# Patient Record
Sex: FEMALE | Race: WHITE | Employment: FULL TIME | URBAN - METROPOLITAN AREA
[De-identification: names, ages, dates, MRNs, and addresses within clinical notes are randomized per-mention and may not be internally consistent; named-entity substitution may affect disease eponyms.]

---

## 2020-03-02 ENCOUNTER — OFFICE VISIT (OUTPATIENT)
Dept: FAMILY MEDICINE CLINIC | Age: 40
End: 2020-03-02

## 2020-03-02 VITALS
WEIGHT: 292.5 LBS | OXYGEN SATURATION: 97 % | DIASTOLIC BLOOD PRESSURE: 83 MMHG | SYSTOLIC BLOOD PRESSURE: 132 MMHG | TEMPERATURE: 98.2 F | HEIGHT: 67 IN | HEART RATE: 83 BPM | RESPIRATION RATE: 16 BRPM | BODY MASS INDEX: 45.91 KG/M2

## 2020-03-02 DIAGNOSIS — J30.89 NON-SEASONAL ALLERGIC RHINITIS, UNSPECIFIED TRIGGER: Primary | ICD-10-CM

## 2020-03-02 DIAGNOSIS — H69.83 DYSFUNCTION OF BOTH EUSTACHIAN TUBES: ICD-10-CM

## 2020-03-02 DIAGNOSIS — Z12.39 SCREENING FOR MALIGNANT NEOPLASM OF BREAST: ICD-10-CM

## 2020-03-02 DIAGNOSIS — G89.29 CHRONIC PAIN OF LEFT ANKLE: ICD-10-CM

## 2020-03-02 DIAGNOSIS — R42 VERTIGO: ICD-10-CM

## 2020-03-02 DIAGNOSIS — Z91.018 HISTORY OF FOOD ANAPHYLAXIS: ICD-10-CM

## 2020-03-02 DIAGNOSIS — M25.572 CHRONIC PAIN OF LEFT ANKLE: ICD-10-CM

## 2020-03-02 DIAGNOSIS — Z80.3 FAMILY HISTORY OF MALIGNANT NEOPLASM OF BREAST IN FIRST DEGREE RELATIVE DIAGNOSED WHEN YOUNGER THAN 50 YEARS OF AGE: ICD-10-CM

## 2020-03-02 RX ORDER — FLUTICASONE PROPIONATE 50 MCG
2 SPRAY, SUSPENSION (ML) NASAL DAILY
Qty: 3 BOTTLE | Refills: 1 | Status: SHIPPED | OUTPATIENT
Start: 2020-03-02 | End: 2020-09-17 | Stop reason: SDUPTHER

## 2020-03-02 RX ORDER — MONTELUKAST SODIUM 10 MG/1
10 TABLET ORAL DAILY
Qty: 90 TAB | Refills: 1 | Status: SHIPPED | OUTPATIENT
Start: 2020-03-02 | End: 2020-09-18

## 2020-03-02 RX ORDER — EPINEPHRINE 0.3 MG/.3ML
0.3 INJECTION SUBCUTANEOUS
Qty: 2 SYRINGE | Refills: 1 | Status: SHIPPED | OUTPATIENT
Start: 2020-03-02 | End: 2020-03-02

## 2020-03-02 RX ORDER — MECLIZINE HYDROCHLORIDE 25 MG/1
25 TABLET ORAL
Qty: 30 TAB | Refills: 1 | Status: SHIPPED | OUTPATIENT
Start: 2020-03-02 | End: 2020-03-12

## 2020-03-02 RX ORDER — CETIRIZINE HCL 10 MG
10 TABLET ORAL DAILY
Qty: 90 TAB | Refills: 1 | Status: SHIPPED | OUTPATIENT
Start: 2020-03-02 | End: 2022-01-28

## 2020-03-02 NOTE — PROGRESS NOTES
Leonela Andrade is a 42 y.o. female    Chief Complaint   Patient presents with    New Patient    Establish Care     No Specific conserns at this time. 1. Have you been to the ER, urgent care clinic since your last visit? Hospitalized since your last visit? No    2. Have you seen or consulted any other health care providers outside of the [address] [place] [address] since your last visit? Include any pap smears or colon screening.  No

## 2020-03-02 NOTE — PATIENT INSTRUCTIONS
Managing Your Allergies: Care Instructions Your Care Instructions Managing your allergies is an important part of staying healthy. Your doctor will help you find out what may be causing the allergies. Common causes of allergy symptoms are house dust and dust mites, animal dander, mold, and pollen. As soon as you know what triggers your symptoms, try to reduce your exposure to your triggers. This can help prevent allergy symptoms, asthma, and other health problems. Ask your doctor about allergy medicine or immunotherapy. These treatments may help reduce or prevent allergy symptoms. Follow-up care is a key part of your treatment and safety. Be sure to make and go to all appointments, and call your doctor if you are having problems. It's also a good idea to know your test results and keep a list of the medicines you take. How can you care for yourself at home? · If you think that dust or dust mites are causing your allergies: 
? Wash sheets, pillowcases, and other bedding every week in hot water. ? Use airtight, dust-proof covers for pillows, duvets, and mattresses. Avoid plastic covers, because they tend to tear quickly and do not \"breathe. \" Wash according to the instructions. ? Remove extra blankets and pillows that you don't need. ? Use blankets that are machine-washable. ? Don't use home humidifiers. They can help mites live longer. · Use air-conditioning. Change or clean all filters every month. Keep windows closed. Use high-efficiency air filters. Don't use window or attic fans, which draw dust into the air. · If you're allergic to pet dander, keep pets outside or, at the very least, out of your bedroom. Old carpet and cloth-covered furniture can hold a lot of animal dander. You may need to replace them. · Look for signs of cockroaches. Use cockroach baits to get rid of them. Then clean your home well.  
· If you're allergic to mold, don't keep indoor plants, because molds can grow in soil. Get rid of furniture, rugs, and drapes that smell musty. Check for mold in the bathroom. · If you're allergic to pollen, stay inside when pollen counts are high. · Don't smoke or let anyone else smoke in your house. Don't use fireplaces or wood-burning stoves. Avoid paint fumes, perfumes, and other strong odors. When should you call for help? Give an epinephrine shot if: 
  · You think you are having a severe allergic reaction.  
 After giving an epinephrine shot call 911, even if you feel better. 
 Call 911 if: 
  · You have symptoms of a severe allergic reaction. These may include: 
? Sudden raised, red areas (hives) all over your body. ? Swelling of the throat, mouth, lips, or tongue. ? Trouble breathing. ? Passing out (losing consciousness). Or you may feel very lightheaded or suddenly feel weak, confused, or restless.  
  · You have been given an epinephrine shot, even if you feel better.  
 Call your doctor now or seek immediate medical care if: 
  · You have symptoms of an allergic reaction, such as: ? A rash or hives (raised, red areas on the skin). ? Itching. ? Swelling. ? Belly pain, nausea, or vomiting.  
 Watch closely for changes in your health, and be sure to contact your doctor if: 
  · Your allergies get worse.  
  · You need help controlling your allergies.  
  · You have questions about allergy testing.  
  · You do not get better as expected. Where can you learn more? Go to http://luda-keon.info/. Enter L249 in the search box to learn more about \"Managing Your Allergies: Care Instructions. \" Current as of: April 7, 2019 Content Version: 12.2 © 5940-7290 [employer]. Care instructions adapted under license by [employer] (which disclaims liability or warranty for this information).  If you have questions about a medical condition or this instruction, always ask your healthcare professional. [facility] disclaims any warranty or liability for your use of this information. Dizziness: Care Instructions Your Care Instructions Dizziness is the feeling of unsteadiness or fuzziness in your head. It is different than having vertigo, which is a feeling that the room is spinning or that you are moving or falling. It is also different from lightheadedness, which is the feeling that you are about to faint. It can be hard to know what causes dizziness. Some people feel dizzy when they have migraine headaches. Sometimes bouts of flu can make you feel dizzy. Some medical conditions, such as heart problems or high blood pressure, can make you feel dizzy. Many medicines can cause dizziness, including medicines for high blood pressure, pain, or anxiety. If a medicine causes your symptoms, your doctor may recommend that you stop or change the medicine. If it is a problem with your heart, you may need medicine to help your heart work better. If there is no clear reason for your symptoms, your doctor may suggest watching and waiting for a while to see if the dizziness goes away on its own. Follow-up care is a key part of your treatment and safety. Be sure to make and go to all appointments, and call your doctor if you are having problems. It's also a good idea to know your test results and keep a list of the medicines you take. How can you care for yourself at home? · If your doctor recommends or prescribes medicine, take it exactly as directed. Call your doctor if you think you are having a problem with your medicine. · Do not drive while you feel dizzy. · Try to prevent falls. Steps you can take include: ? Using nonskid mats, adding grab bars near the tub, and using night-lights. ? Clearing your home so that walkways are free of anything you might trip on. 
? Letting family and friends know that you have been feeling dizzy. This will help them know how to help you. When should you call for help? Call 911 anytime you think you may need emergency care. For example, call if: 
  · You passed out (lost consciousness).  
  · You have dizziness along with symptoms of a heart attack. These may include: 
? Chest pain or pressure, or a strange feeling in the chest. 
? Sweating. ? Shortness of breath. ? Nausea or vomiting. ? Pain, pressure, or a strange feeling in the back, neck, jaw, or upper belly or in one or both shoulders or arms. ? Lightheadedness or sudden weakness. ? A fast or irregular heartbeat.  
  · You have symptoms of a stroke. These may include: 
? Sudden numbness, tingling, weakness, or loss of movement in your face, arm, or leg, especially on only one side of your body. ? Sudden vision changes. ? Sudden trouble speaking. ? Sudden confusion or trouble understanding simple statements. ? Sudden problems with walking or balance. ? A sudden, severe headache that is different from past headaches.  
 Call your doctor now or seek immediate medical care if: 
  · You feel dizzy and have a fever, headache, or ringing in your ears.  
  · You have new or increased nausea and vomiting.  
  · Your dizziness does not go away or comes back.  
 Watch closely for changes in your health, and be sure to contact your doctor if: 
  · You do not get better as expected. Where can you learn more? Go to http://luda-keon.info/. Enter Q439 in the search box to learn more about \"Dizziness: Care Instructions. \" Current as of: June 26, 2019 Content Version: 12.2 © 4649-6387 [employer]. Care instructions adapted under license by [employer] (which disclaims liability or warranty for this information). If you have questions about a medical condition or this instruction, always ask your healthcare professional. [address] any warranty or liability for your use of this information. Vertigo: Care Instructions Your Care Instructions Vertigo is the feeling that you or your surroundings are moving when there is no actual movement. It is often described as a feeling of spinning, whirling, falling, or tilting. Vertigo may make you vomit or feel nauseated. You may have trouble standing or walking and may lose your balance. Vertigo is often related to an inner ear problem, but it can have other more serious causes. If vertigo continues, you may need more tests to find its cause. Follow-up care is a key part of your treatment and safety. Be sure to make and go to all appointments, and call your doctor if you are having problems. It's also a good idea to know your test results and keep a list of the medicines you take. How can you care for yourself at home? · Do not lie flat on your back. Prop yourself up slightly. This may reduce the spinning feeling. Keep your eyes open. · Move slowly so that you do not fall. · If your doctor recommends medicine, take it exactly as directed. · Do not drive while you are having vertigo. Certain exercises, called Galarza-Daroff exercises, can help decrease vertigo. To do Galarza-Daroff exercises: · Sit on the edge of a bed or sofa and quickly lie down on the side that causes the worst vertigo. Lie on your side with your ear down. · Stay in this position for at least 30 seconds or until the vertigo goes away. · Sit up. If this causes vertigo, wait for it to stop. · Repeat the procedure on the other side. · Repeat this 10 times. Do these exercises 2 times a day until the vertigo is gone. When should you call for help? Call 911 anytime you think you may need emergency care. For example, call if: 
  · You passed out (lost consciousness).  
  · You have symptoms of a stroke. These may include: 
? Sudden numbness, tingling, weakness, or loss of movement in your face, arm, or leg, especially on only one side of your body. ? Sudden vision changes. ? Sudden trouble speaking. ? Sudden confusion or trouble understanding simple statements. ? Sudden problems with walking or balance. ? A sudden, severe headache that is different from past headaches.  
 Call your doctor now or seek immediate medical care if: 
  · Vertigo occurs with a fever, a headache, or ringing in your ears.  
  · You have new or increased nausea and vomiting.  
 Watch closely for changes in your health, and be sure to contact your doctor if: 
  · Vertigo gets worse or happens more often.  
  · Vertigo has not gotten better after 2 weeks. Where can you learn more? Go to http://luda-keon.info/. Enter T202 in the search box to learn more about \"Vertigo: Care Instructions. \" Current as of: October 21, 2018 Content Version: 12.2 © 0753-6806 [employer]. Care instructions adapted under license by [employer] (which disclaims liability or warranty for this information). If you have questions about a medical condition or this instruction, always ask your healthcare professional. [place] [address] any warranty or liability for your use of this information.

## 2020-03-03 NOTE — PROGRESS NOTES
Elin Madrid is a 42 y.o. female who presents with the following complaints:  Chief Complaint   Patient presents with    New Patient    Establish Care     No Specific conserns at this time. Subjective:    HPI:   Patient recently moved to the area. She is an  for [employer]. Most recently posted in [address] [place] [address] (briefly), prior to this was stationed in [facility]. Presents to establish care and for evaluation of allergic rhinitis/sinus congestion, ear pain/pressure, vertigo, left ankle pain/swelling, food allergy/anaphylaxis, and history of breast cancer. Reports long hx of environmental allergies with sinus surgery in the past. Current treatment plan includes flonase nasal spray and allegra. Reports good compliance with antihistamine; compliance not as good for nasal steroid spray. Has used sinus rinses in the past but has not gotten back into the habit since her move. She is not interested in immunotherapy/allergy shots. Has had 2 brief episodes of vertigo over the past 2 months; describes as abrupt onset of sensation that room is spinning. First episode seemed to be triggered by rolling over in bed. Used over the counter luisa product with minimal relief. Symptoms resolved spontaneously. 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 Needs refill of epipen. Also notes allergy to corn, much less severe. C/o chronic left ankle swelling, stiffness, pain following multiple sprains over the course of several years. Reports she was receiving laser treatments for her symptoms prior to her move, would like to pursue further treatment here. Reports mother developed breast cancer at age 45; patient has been having yearly screening mammograms since age 31, now due for her annual screening. Pertinent PMH/FH/SH:  History reviewed. No pertinent past medical history.   Past Surgical History:   Procedure Laterality Date    HX REFRACTIVE SURGERY Bilateral     HX TONSILLECTOMY  2014     Family History   Problem Relation Age of Onset    Cancer Mother         Breast Cancer     Social History     Socioeconomic History    Marital status:      Spouse name: Not on file    Number of children: Not on file    Years of education: Not on file    Highest education level: Not on file   Tobacco Use    Smoking status: Never Smoker    Smokeless tobacco: Never Used   Substance and Sexual Activity    Alcohol use: Not Currently    Drug use: Never     Advanced Directives: N      There are no active problems to display for this patient.       Nurse notes were reviewed and are correct  Review of Systems - negative except as listed above in the HPI    Objective:     Vitals:    03/02/20 1016   BP: 132/83   Pulse: 83   Resp: 16   Temp: 98.2 °F (36.8 °C)   TempSrc: Oral   SpO2: 97%   Weight: 292 lb 8 oz (132.7 kg)   Height: 5' 7\" (1.702 m)     Physical Examination: General appearance - alert, well appearing, and in no distress, oriented to person, place, and time, overweight and well hydrated  Mental status - normal mood, behavior, speech, dress, motor activity, and thought processes  Eyes - sclera anicteric  Ears - bilateral TM's and external ear canals normal  Nose - normal and patent, no erythema, discharge or polyps, normal nontender sinuses and clear rhinorrhea  Mouth - mucous membranes moist, pharynx normal without lesions  Neck - supple, no significant adenopathy, thyroid exam: thyroid is normal in size without nodules or tenderness  Chest - clear to auscultation, no wheezes, rales or rhonchi, symmetric air entry  Heart - normal rate, regular rhythm, normal S1, S2, no murmurs, rubs, clicks or gallops  Abdomen - soft, nontender, nondistended, no masses or organomegaly  bowel sounds normal  Neurological - alert, oriented, normal speech, no focal findings or movement disorder noted  Musculoskeletal -no muscular tenderness noted; left ankle soft tissue edema, reduced ROM, mild diffuse tenderness  Extremities - no pedal edema noted, intact peripheral pulses  Skin - normal coloration and turgor, no rashes, no suspicious skin lesions noted    Assessment/ Plan:   Diagnoses and all orders for this visit:    1. Non-seasonal allergic rhinitis, unspecified trigger  2. Dysfunction of both eustachian tubes  Continue antihistamine  Improve compliance with nasal steroid  Add montelukast  Resume daily sinus rinse with neti pot  -     montelukast (SINGULAIR) 10 mg tablet; Take 1 Tab by mouth daily. -     cetirizine (ZYRTEC) 10 mg tablet; Take 1 Tab by mouth daily. -     fluticasone propionate (FLONASE) 50 mcg/actuation nasal spray; 2 Sprays by Both Nostrils route daily. 3. Vertigo  Add rx  -     meclizine (ANTIVERT) 25 mg tablet; Take 1 Tab by mouth three (3) times daily as needed for Dizziness for up to 10 days. 4. Chronic pain of left ankle  Refer for further eval and management  -     REFERRAL TO ORTHOPEDICS    5. History of food anaphylaxis  -     EPINEPHrine (EPIPEN 2-DAYAN) 0.3 mg/0.3 mL injection; 0.3 mL by IntraMUSCular route once as needed for Allergic Response for up to 1 dose. 6. Screening for malignant neoplasm of breast  7. Family history of malignant neoplasm of breast in first degree relative diagnosed when younger than 48years of age  -     [facility] [de-identified] BI SCREENING INCL CAD; Future    8. BMI 45.0-49.9, adult ([facility] [address]  I have reviewed/discussed the above normal BMI with the patient. I have recommended the following interventions: dietary management education, guidance, and counseling, encourage exercise and monitor weight Gaby Perez Follow-up and Dispositions    · Return in about 10 weeks (around 5/11/2020), or if symptoms worsen or fail to improve, for annual physical.         I have discussed the diagnosis with the patient and the intended plan as seen in the above orders.   The patient has received an after-visit summary and questions were answered concerning future plans. The patient verbalizes understanding. Medication Side Effects and Warnings were discussed with patient: yes  Patient Labs were reviewed and or requested: yes  Patient Past Records were reviewed and or requested: yes    Patient Instructions          Managing Your Allergies: Care Instructions  Your Care Instructions    Managing your allergies is an important part of staying healthy. Your doctor will help you find out what may be causing the allergies. Common causes of allergy symptoms are house dust and dust mites, animal dander, mold, and pollen. As soon as you know what triggers your symptoms, try to reduce your exposure to your triggers. This can help prevent allergy symptoms, asthma, and other health problems. Ask your doctor about allergy medicine or immunotherapy. These treatments may help reduce or prevent allergy symptoms. Follow-up care is a key part of your treatment and safety. Be sure to make and go to all appointments, and call your doctor if you are having problems. It's also a good idea to know your test results and keep a list of the medicines you take. How can you care for yourself at home? · If you think that dust or dust mites are causing your allergies:  ? Wash sheets, pillowcases, and other bedding every week in hot water. ? Use airtight, dust-proof covers for pillows, duvets, and mattresses. Avoid plastic covers, because they tend to tear quickly and do not \"breathe. \" Wash according to the instructions. ? Remove extra blankets and pillows that you don't need. ? Use blankets that are machine-washable. ? Don't use home humidifiers. They can help mites live longer. · Use air-conditioning. Change or clean all filters every month. Keep windows closed. Use high-efficiency air filters. Don't use window or attic fans, which draw dust into the air.   · If you're allergic to pet dander, keep pets outside or, at the very least, out of your bedroom. Old carpet and cloth-covered furniture can hold a lot of animal dander. You may need to replace them. · Look for signs of cockroaches. Use cockroach baits to get rid of them. Then clean your home well. · If you're allergic to mold, don't keep indoor plants, because molds can grow in soil. Get rid of furniture, rugs, and drapes that smell musty. Check for mold in the bathroom. · If you're allergic to pollen, stay inside when pollen counts are high. · Don't smoke or let anyone else smoke in your house. Don't use fireplaces or wood-burning stoves. Avoid paint fumes, perfumes, and other strong odors. When should you call for help? Give an epinephrine shot if:    · You think you are having a severe allergic reaction.    After giving an epinephrine shot call 911, even if you feel better.   Call 911 if:    · You have symptoms of a severe allergic reaction. These may include:  ? Sudden raised, red areas (hives) all over your body. ? Swelling of the throat, mouth, lips, or tongue. ? Trouble breathing. ? Passing out (losing consciousness). Or you may feel very lightheaded or suddenly feel weak, confused, or restless.     · You have been given an epinephrine shot, even if you feel better.    Call your doctor now or seek immediate medical care if:    · You have symptoms of an allergic reaction, such as:  ? A rash or hives (raised, red areas on the skin). ? Itching. ? Swelling. ? Belly pain, nausea, or vomiting.    Watch closely for changes in your health, and be sure to contact your doctor if:    · Your allergies get worse.     · You need help controlling your allergies.     · You have questions about allergy testing.     · You do not get better as expected. Where can you learn more? Go to http://luda-keon.info/. Enter L249 in the search box to learn more about \"Managing Your Allergies: Care Instructions. \"  Current as of: April 7, 2019  Content Version: 12.2  © 7859-4386 Healthwise, Incorporated. Care instructions adapted under license by [employer] (which disclaims liability or warranty for this information). If you have questions about a medical condition or this instruction, always ask your healthcare professional. [address] any warranty or liability for your use of this information. Dizziness: Care Instructions  Your Care Instructions  Dizziness is the feeling of unsteadiness or fuzziness in your head. It is different than having vertigo, which is a feeling that the room is spinning or that you are moving or falling. It is also different from lightheadedness, which is the feeling that you are about to faint. It can be hard to know what causes dizziness. Some people feel dizzy when they have migraine headaches. Sometimes bouts of flu can make you feel dizzy. Some medical conditions, such as heart problems or high blood pressure, can make you feel dizzy. Many medicines can cause dizziness, including medicines for high blood pressure, pain, or anxiety. If a medicine causes your symptoms, your doctor may recommend that you stop or change the medicine. If it is a problem with your heart, you may need medicine to help your heart work better. If there is no clear reason for your symptoms, your doctor may suggest watching and waiting for a while to see if the dizziness goes away on its own. Follow-up care is a key part of your treatment and safety. Be sure to make and go to all appointments, and call your doctor if you are having problems. It's also a good idea to know your test results and keep a list of the medicines you take. How can you care for yourself at home? · If your doctor recommends or prescribes medicine, take it exactly as directed. Call your doctor if you think you are having a problem with your medicine. · Do not drive while you feel dizzy. · Try to prevent falls. Steps you can take include:  ?  Using nonskid mats, adding grab bars near the tub, and using night-lights. ? Clearing your home so that walkways are free of anything you might trip on.  ? Letting family and friends know that you have been feeling dizzy. This will help them know how to help you. When should you call for help? Call 911 anytime you think you may need emergency care. For example, call if:    · You passed out (lost consciousness).     · You have dizziness along with symptoms of a heart attack. These may include:  ? Chest pain or pressure, or a strange feeling in the chest.  ? Sweating. ? Shortness of breath. ? Nausea or vomiting. ? Pain, pressure, or a strange feeling in the back, neck, jaw, or upper belly or in one or both shoulders or arms. ? Lightheadedness or sudden weakness. ? A fast or irregular heartbeat.     · You have symptoms of a stroke. These may include:  ? Sudden numbness, tingling, weakness, or loss of movement in your face, arm, or leg, especially on only one side of your body. ? Sudden vision changes. ? Sudden trouble speaking. ? Sudden confusion or trouble understanding simple statements. ? Sudden problems with walking or balance. ? A sudden, severe headache that is different from past headaches.    Call your doctor now or seek immediate medical care if:    · You feel dizzy and have a fever, headache, or ringing in your ears.     · You have new or increased nausea and vomiting.     · Your dizziness does not go away or comes back.    Watch closely for changes in your health, and be sure to contact your doctor if:    · You do not get better as expected. Where can you learn more? Go to http://luda-keon.info/. Enter B848 in the search box to learn more about \"Dizziness: Care Instructions. \"  Current as of: June 26, 2019  Content Version: 12.2  © 0966-5065 [employer]. Care instructions adapted under license by [employer] (which disclaims liability or warranty for this information).  If you have questions about a medical condition or this instruction, always ask your healthcare professional. [address] any warranty or liability for your use of this information. Vertigo: Care Instructions  Your Care Instructions    Vertigo is the feeling that you or your surroundings are moving when there is no actual movement. It is often described as a feeling of spinning, whirling, falling, or tilting. Vertigo may make you vomit or feel nauseated. You may have trouble standing or walking and may lose your balance. Vertigo is often related to an inner ear problem, but it can have other more serious causes. If vertigo continues, you may need more tests to find its cause. Follow-up care is a key part of your treatment and safety. Be sure to make and go to all appointments, and call your doctor if you are having problems. It's also a good idea to know your test results and keep a list of the medicines you take. How can you care for yourself at home? · Do not lie flat on your back. Prop yourself up slightly. This may reduce the spinning feeling. Keep your eyes open. · Move slowly so that you do not fall. · If your doctor recommends medicine, take it exactly as directed. · Do not drive while you are having vertigo. Certain exercises, called Galarza-Daroff exercises, can help decrease vertigo. To do Galarza-Daroff exercises:  · Sit on the edge of a bed or sofa and quickly lie down on the side that causes the worst vertigo. Lie on your side with your ear down. · Stay in this position for at least 30 seconds or until the vertigo goes away. · Sit up. If this causes vertigo, wait for it to stop. · Repeat the procedure on the other side. · Repeat this 10 times. Do these exercises 2 times a day until the vertigo is gone. When should you call for help? Call 911 anytime you think you may need emergency care.  For example, call if:    · You passed out (lost consciousness).     · You have symptoms of a stroke. These may include:  ? Sudden numbness, tingling, weakness, or loss of movement in your face, arm, or leg, especially on only one side of your body. ? Sudden vision changes. ? Sudden trouble speaking. ? Sudden confusion or trouble understanding simple statements. ? Sudden problems with walking or balance. ? A sudden, severe headache that is different from past headaches.    Call your doctor now or seek immediate medical care if:    · Vertigo occurs with a fever, a headache, or ringing in your ears.     · You have new or increased nausea and vomiting.    Watch closely for changes in your health, and be sure to contact your doctor if:    · Vertigo gets worse or happens more often.     · Vertigo has not gotten better after 2 weeks. Where can you learn more? Go to http://luda-keon.info/. Enter D449 in the search box to learn more about \"Vertigo: Care Instructions. \"  Current as of: October 21, 2018  Content Version: 12.2  © 2127-0343 "[employer] Incorporated. Care instructions adapted under license by [facility] (which disclaims liability or warranty for this information). If you have questions about a medical condition or this instruction, always ask your healthcare professional. [place] [address] any warranty or liability for your use of this information.             Romel ELLIOTT

## 2020-03-13 ENCOUNTER — HOSPITAL ENCOUNTER (OUTPATIENT)
Dept: MAMMOGRAPHY | Age: 40
Discharge: HOME | End: 2020-03-13
Attending: NURSE PRACTITIONER
Payer: COMMERCIAL

## 2020-03-13 DIAGNOSIS — Z80.3 FAMILY HISTORY OF MALIGNANT NEOPLASM OF BREAST IN FIRST DEGREE RELATIVE DIAGNOSED WHEN YOUNGER THAN 50 YEARS OF AGE: ICD-10-CM

## 2020-03-13 DIAGNOSIS — Z12.39 SCREENING FOR MALIGNANT NEOPLASM OF BREAST: ICD-10-CM

## 2020-03-13 PROCEDURE — 77063 BREAST TOMOSYNTHESIS BI: CPT

## 2020-09-16 DIAGNOSIS — J30.89 NON-SEASONAL ALLERGIC RHINITIS, UNSPECIFIED TRIGGER: ICD-10-CM

## 2020-09-16 DIAGNOSIS — H69.83 DYSFUNCTION OF BOTH EUSTACHIAN TUBES: ICD-10-CM

## 2020-09-18 RX ORDER — FLUTICASONE PROPIONATE 50 MCG
2 SPRAY, SUSPENSION (ML) NASAL DAILY
Qty: 3 BOTTLE | Refills: 1 | Status: SHIPPED | OUTPATIENT
Start: 2020-09-18 | End: 2022-02-22 | Stop reason: SDUPTHER

## 2020-09-18 RX ORDER — MONTELUKAST SODIUM 10 MG/1
TABLET ORAL
Qty: 90 TAB | Refills: 0 | Status: SHIPPED | OUTPATIENT
Start: 2020-09-18 | End: 2020-12-11 | Stop reason: SDUPTHER

## 2020-12-11 DIAGNOSIS — H69.83 DYSFUNCTION OF BOTH EUSTACHIAN TUBES: ICD-10-CM

## 2020-12-11 DIAGNOSIS — J30.89 NON-SEASONAL ALLERGIC RHINITIS, UNSPECIFIED TRIGGER: ICD-10-CM

## 2020-12-12 RX ORDER — MONTELUKAST SODIUM 10 MG/1
10 TABLET ORAL DAILY
Qty: 90 TAB | Refills: 0 | Status: SHIPPED | OUTPATIENT
Start: 2020-12-12 | End: 2021-03-07

## 2021-01-19 ENCOUNTER — VIRTUAL VISIT (OUTPATIENT)
Dept: FAMILY MEDICINE CLINIC | Age: 41
End: 2021-01-19
Payer: COMMERCIAL

## 2021-01-19 DIAGNOSIS — M25.572 CHRONIC PAIN OF LEFT ANKLE: Primary | ICD-10-CM

## 2021-01-19 DIAGNOSIS — Z91.018 HISTORY OF FOOD ANAPHYLAXIS: ICD-10-CM

## 2021-01-19 DIAGNOSIS — Z12.31 ENCOUNTER FOR SCREENING MAMMOGRAM FOR MALIGNANT NEOPLASM OF BREAST: ICD-10-CM

## 2021-01-19 DIAGNOSIS — G89.29 CHRONIC PAIN OF LEFT ANKLE: Primary | ICD-10-CM

## 2021-01-19 DIAGNOSIS — M79.2 NEUROPATHIC PAIN: ICD-10-CM

## 2021-01-19 DIAGNOSIS — J30.89 NON-SEASONAL ALLERGIC RHINITIS, UNSPECIFIED TRIGGER: ICD-10-CM

## 2021-01-19 DIAGNOSIS — Z80.3 FAMILY HISTORY OF MALIGNANT NEOPLASM OF BREAST IN FIRST DEGREE RELATIVE DIAGNOSED WHEN YOUNGER THAN 50 YEARS OF AGE: ICD-10-CM

## 2021-01-19 PROCEDURE — 99214 OFFICE O/P EST MOD 30 MIN: CPT | Performed by: NURSE PRACTITIONER

## 2021-01-19 RX ORDER — PREGABALIN 75 MG/1
75 CAPSULE ORAL 2 TIMES DAILY
Qty: 180 CAP | Refills: 0 | Status: SHIPPED
Start: 2021-01-19 | End: 2021-06-27

## 2021-01-19 RX ORDER — PREGABALIN 75 MG/1
75 CAPSULE ORAL 2 TIMES DAILY
COMMUNITY
Start: 2020-12-15 | End: 2021-01-19 | Stop reason: SDUPTHER

## 2021-01-19 RX ORDER — EPINEPHRINE 0.3 MG/.3ML
0.3 INJECTION SUBCUTANEOUS
Qty: 0.6 ML | Refills: 1 | Status: SHIPPED | OUTPATIENT
Start: 2021-01-19 | End: 2021-01-19

## 2021-01-19 NOTE — PROGRESS NOTES
Joana Thurman is a 47 y.o. female who was seen by synchronous (real-time) audio-video technology on 1/19/2021 for Medication Refill, Other (Covid Vaccine ), and Weight Management        Assessment & Plan:   Diagnoses and all orders for this visit:    1. Chronic pain of left ankle  2. Neuropathic pain  Improving  Continue PT  Continue pregabalin- we discussed   -     pregabalin (LYRICA) 75 mg capsule; Take 1 Cap by mouth two (2) times a day. Max Daily Amount: 150 mg.    3. Non-seasonal allergic rhinitis, unspecified trigger  controlled  Continue rx    4. BMI 45.0-49.9, adult ([address]  I have reviewed/discussed the above normal BMI with the patient. I have recommended the following interventions: dietary management education, guidance, and counseling, encourage exercise and monitor weight . Discussed diet plans for weight loss, such as high protein diet, Mediterranean, DASH diet, paleo, and commercial weight loss programs such as weight watchers  Discussed possible addition of medications such as phentermine and topiramate to help with appetite suppression  She plans to consider options while she completes post-surgical PT and will let us know how she plans to proceed when she is ready. 5. Family history of malignant neoplasm of breast in first degree relative diagnosed when younger than 48years of age  10. Encounter for screening mammogram for malignant neoplasm of breast  -     [facility] BI SCREENING INCL CAD; Future    7. History of food anaphylaxis  Refill rx  Take epipen to vaccination appt, inform administering staff of her hx of anaphylaxis/food allergy. Remain at site for period of observation of at least 20 minutes post vaccination  -     EPINEPHrine (EPIPEN) 0.3 mg/0.3 mL injection; 0.3 mL by IntraMUSCular route once as needed for Allergic Response for up to 1 dose. Follow-up and Dispositions    · Return in about 3 months (around 4/19/2021) for ankle pain, weight loss.        I have discussed the diagnosis with the patient and the intended plan as seen in the above orders, and questions were answered concerning future plans. Patient conveyed understanding of the plan at the time of the visit. 712  Subjective:     HPI:    Presents for follow up of chronic ankle pain, obesity, allergic rhinitis, and food allergy. Underwent left ankle surgery in November, doing well, making good progress with physical therapy. Currently using ankle brace and 1 crutch. Her progress in her recovery has motivated her to pursue weight loss once she is cleared to exercise. She is interested in learning more about dietary recommendations for weight loss and medications that may assist with weight loss. Reports continued painful \"zinging\" pain in the ankle post op, was present before surgery but now seems to have been aggravated by time in the cast. Symptoms make it difficult to fall asleep most nights. Her orthopedist started pregabalin, which is giving some relief, but has advised that she may need to be on this for a longer period of time and recommended she discuss rx with PCP. Requesting refill of epipen for food allergies. Wants to have on hand when she receives covid vaccine at work later this week. Prior to Admission medications    Medication Sig Start Date End Date Taking? Authorizing Provider   pregabalin (LYRICA) 75 mg capsule Take 1 Cap by mouth two (2) times a day. Max Daily Amount: 150 mg. 1/19/21  Yes Mariano Thomas, NP   EPINEPHrine (EPIPEN) 0.3 mg/0.3 mL injection 0.3 mL by IntraMUSCular route once as needed for Allergic Response for up to 1 dose. 1/19/21 1/19/21 Yes Mariano Thomas, NP   montelukast (SINGULAIR) 10 mg tablet Take 1 Tab by mouth daily. 12/12/20  Yes Mariano Thomas, NP   fluticasone propionate (FLONASE) 50 mcg/actuation nasal spray 2 Sprays by Both Nostrils route daily. 9/18/20  Yes Mariano Thomas, NP   cetirizine (ZYRTEC) 10 mg tablet Take 1 Tab by mouth daily.  3/2/20 Yes Mariano Thomas Q, NP   pregabalin (LYRICA) 75 mg capsule Take 75 mg by mouth two (2) times a day. 12/15/20 1/19/21  Provider, Historical     There is no problem list on file for this patient. Allergies   Allergen Reactions    Corn Hives and Itching    Fish Containing Products Shortness of Breath    Grass Pollen Hives    Progesterone Hives     History reviewed. No pertinent past medical history. Past Surgical History:   Procedure Laterality Date    HX REFRACTIVE SURGERY Bilateral     HX TONSILLECTOMY  2014     Family History   Problem Relation Age of Onset    Cancer Mother         Breast Cancer    Breast Cancer Mother 45        alive     Social History     Tobacco Use    Smoking status: Never Smoker    Smokeless tobacco: Never Used   Substance Use Topics    Alcohol use: Not Currently       Review of Systems   Constitutional: Negative for chills, fever, malaise/fatigue and weight loss. HENT: Negative. Eyes: Negative. Respiratory: Negative. Cardiovascular: Negative. Gastrointestinal: Negative. Genitourinary: Negative. Musculoskeletal: Positive for joint pain. Skin: Negative. Neurological: Negative. Endo/Heme/Allergies: Negative. Psychiatric/Behavioral: Negative. Objective:   No flowsheet data found. General: alert, cooperative, no distress   Mental  status: normal mood, behavior, speech, dress, motor activity, and thought processes, able to follow commands   HENT: NCAT   Neck: no visualized mass   Resp: no respiratory distress   Neuro: no gross deficits   Skin: no discoloration or lesions of concern on visible areas   Psychiatric: normal affect, consistent with stated mood, no evidence of hallucinations     Additional exam findings:   none    We discussed the expected course, resolution and complications of the diagnosis(es) in detail. Medication risks, benefits, costs, interactions, and alternatives were discussed as indicated.   I advised her to contact the office if her condition worsens, changes or fails to improve as anticipated. She expressed understanding with the diagnosis(es) and plan. Aman Thomas, who was evaluated through a patient-initiated, synchronous (real-time) audio-video encounter, and/or her healthcare decision maker, is aware that it is a billable service, with coverage as determined by her insurance carrier. She provided verbal consent to proceed: Yes, and patient identification was verified. It was conducted pursuant to the emergency declaration under the [address] [place] [address], [address] [place] [address] authority and the [employer] and [employer] Act. A caregiver was present when appropriate. Ability to conduct physical exam was limited. I was at home. The patient was at home.       Jana Rodriguez NP  01/19/21

## 2021-01-19 NOTE — PATIENT INSTRUCTIONS
Neuropathic Pain: Care Instructions Your Care Instructions Neuropathic pain is caused by pressure on or damage to your nerves. It's often simply called nerve pain. Some people feel this type of pain all the time. For others, it comes and goes. Diabetes, shingles, or an injury can cause nerve pain. Many people say the pain feels sharp, burning, or stabbing. But some people feel it as a dull ache. In some cases, it makes your skin very sensitive. So touch, pressure, and other sensations that did not hurt before may now cause pain. It's important to know that this kind of pain is real and can affect your quality of life. It's also important to know that treatment can help. Treatment includes pain medicines, exercise, and physical therapy. Medicines can help reduce the number of pain signals that travel over the nerves. This can make the painful areas less sensitive. It can also help you sleep better and improve your mood. But medicines are only one part of successful treatment. Most people do best with more than one kind of treatment. Your doctor may recommend that you try cognitive-behavioral therapy and stress management. Or, if needed, you may decide to try to quit smoking, lower your blood pressure, or better control blood sugar. These kinds of healthy changes can also make a difference. If you feel that your treatment is not working, talk to your doctor. And be sure to tell your doctor if you think you might be depressed or anxious. These are common problems that can also be treated. Follow-up care is a key part of your treatment and safety. Be sure to make and go to all appointments, and call your doctor if you are having problems. It's also a good idea to know your test results and keep a list of the medicines you take. How can you care for yourself at home? · Be safe with medicines. Read and follow all instructions on the label. ? If the doctor gave you a prescription medicine for pain, take it as prescribed. ? If you are not taking a prescription pain medicine, ask your doctor if you can take an over-the-counter medicine. · Save hard tasks for days when you have less pain. Follow a hard task with an easy task. And remember to take breaks. · Relax, and reduce stress. You may want to try deep breathing or meditation. These can help. · Keep moving. Gentle, daily exercise can help reduce pain. Your doctor or physical therapist can tell you what type of exercise is best for you. This may include walking, swimming, and stationary biking. It may also include stretches and range-of-motion exercises. · Try heat, cold packs, and massage. · Get enough sleep. Constant pain can make you more tired. If the pain makes it hard to sleep, talk with your doctor. · Think positively. Your thoughts can affect your pain. Do fun things to distract yourself from the pain. See a movie, read a book, listen to music, or spend time with a friend. · Keep a pain diary. Try to write down how strong your pain is and what it feels like. Also try to notice and write down how your moods, thoughts, sleep, activities, and medicine affect your pain. These notes can help you and your doctor find the best ways to treat your pain. Reducing constipation caused by pain medicine Pain medicines often cause constipation. To reduce constipation: 
· Include fruits, vegetables, beans, and whole grains in your diet each day. These foods are high in fiber. · Drink plenty of fluids, enough so that your urine is light yellow or clear like water. If you have kidney, heart, or liver disease and have to limit fluids, talk with your doctor before you increase the amount of fluids you drink. · Get some exercise every day. Build up slowly to 30 to 60 minutes a day on 5 or more days of the week. · Take a fiber supplement, such as Citrucel or Metamucil, every day if needed. Read and follow all instructions on the label. · Schedule time each day for a bowel movement. Having a daily routine may help. Take your time and do not strain when having a bowel movement. · Ask your doctor about a laxative. The goal is to have one easy bowel movement every 1 to 2 days. Do not let constipation go untreated for more than 3 days. When should you call for help? Call your doctor now or seek immediate medical care if: 
  · You feel sad, anxious, or hopeless for more than a few days. This could mean you are depressed. Depression is common in people who have a lot of pain. But it can be treated.  
  · You have trouble with bowel movements, such as: 
? No bowel movement in 3 days. ? Blood in the anal area, in your stool, or on the toilet paper. ? Diarrhea for more than 24 hours. Watch closely for changes in your health, and be sure to contact your doctor if: 
  · Your pain is getting worse.  
  · You can't sleep because of pain.  
  · You are very worried or anxious about your pain.  
  · You have trouble taking your pain medicine.  
  · You have any concerns about your pain medicine or its side effects.  
  · You have vomiting or cramps for more than 2 hours. Where can you learn more? Go to http://www.gray.com/ Enter N419 in the search box to learn more about \"Neuropathic Pain: Care Instructions. \" Current as of: November 20, 2019               Content Version: 12.6 © 1737-4273 Healthwise, Incorporated. Care instructions adapted under license by [employer] (which disclaims liability or warranty for this information). If you have questions about a medical condition or this instruction, always ask your healthcare professional. [address] any warranty or liability for your use of this information.

## 2021-04-12 ENCOUNTER — HOSPITAL ENCOUNTER (OUTPATIENT)
Dept: MAMMOGRAPHY | Age: 41
Discharge: HOME | End: 2021-04-12
Attending: NURSE PRACTITIONER
Payer: COMMERCIAL

## 2021-04-12 PROCEDURE — 77067 SCR MAMMO BI INCL CAD: CPT

## 2021-06-22 ENCOUNTER — OFFICE VISIT (OUTPATIENT)
Dept: FAMILY MEDICINE CLINIC | Age: 41
End: 2021-06-22
Payer: COMMERCIAL

## 2021-06-22 VITALS
HEART RATE: 77 BPM | TEMPERATURE: 98.2 F | DIASTOLIC BLOOD PRESSURE: 83 MMHG | BODY MASS INDEX: 43.07 KG/M2 | SYSTOLIC BLOOD PRESSURE: 133 MMHG | HEIGHT: 67 IN | WEIGHT: 274.4 LBS | RESPIRATION RATE: 14 BRPM | OXYGEN SATURATION: 99 %

## 2021-06-22 DIAGNOSIS — J30.89 NON-SEASONAL ALLERGIC RHINITIS, UNSPECIFIED TRIGGER: ICD-10-CM

## 2021-06-22 DIAGNOSIS — R73.03 PREDIABETES: ICD-10-CM

## 2021-06-22 DIAGNOSIS — R71.8 MICROCYTOSIS: ICD-10-CM

## 2021-06-22 DIAGNOSIS — R42 VERTIGO: ICD-10-CM

## 2021-06-22 DIAGNOSIS — E78.5 DYSLIPIDEMIA: Primary | ICD-10-CM

## 2021-06-22 DIAGNOSIS — H69.83 DYSFUNCTION OF BOTH EUSTACHIAN TUBES: ICD-10-CM

## 2021-06-22 PROCEDURE — 99214 OFFICE O/P EST MOD 30 MIN: CPT | Performed by: NURSE PRACTITIONER

## 2021-06-22 RX ORDER — MECLIZINE HCL 25MG 25 MG/1
25 TABLET, CHEWABLE ORAL
Qty: 30 TABLET | Refills: 2 | Status: SHIPPED
Start: 2021-06-22 | End: 2021-12-06 | Stop reason: ALTCHOICE

## 2021-06-22 RX ORDER — MONTELUKAST SODIUM 10 MG/1
10 TABLET ORAL DAILY
Qty: 90 TABLET | Refills: 3 | Status: SHIPPED | OUTPATIENT
Start: 2021-06-22 | End: 2021-10-29

## 2021-06-22 RX ORDER — TOPIRAMATE 25 MG/1
25 TABLET ORAL DAILY
Qty: 90 TABLET | Refills: 0 | Status: SHIPPED | OUTPATIENT
Start: 2021-06-22 | End: 2022-01-28

## 2021-06-22 NOTE — PATIENT INSTRUCTIONS
High Cholesterol: Care Instructions  Your Care Instructions     Cholesterol is a type of fat in your blood. It is needed for many body functions, such as making new cells. Cholesterol is made by your body. It also comes from food you eat. High cholesterol means that you have too much of the fat in your blood. This raises your risk of a heart attack and stroke. LDL and HDL are part of your total cholesterol. LDL is the \"bad\" cholesterol. High LDL can raise your risk for heart disease, heart attack, and stroke. HDL is the \"good\" cholesterol. It helps clear bad cholesterol from the body. High HDL is linked with a lower risk of heart disease, heart attack, and stroke. Your cholesterol levels help your doctor find out your risk for having a heart attack or stroke. You and your doctor can talk about whether you need to lower your risk and what treatment is best for you. A heart-healthy lifestyle along with medicines can help lower your cholesterol and your risk. The way you choose to lower your risk will depend on how high your risk is for heart attack and stroke. It will also depend on how you feel about taking medicines. Follow-up care is a key part of your treatment and safety. Be sure to make and go to all appointments, and call your doctor if you are having problems. It's also a good idea to know your test results and keep a list of the medicines you take. How can you care for yourself at home? · Eat a variety of foods every day. Good choices include fruits, vegetables, whole grains (like oatmeal), dried beans and peas, nuts and seeds, soy products (like tofu), and fat-free or low-fat dairy products. · Replace butter, margarine, and hydrogenated or partially hydrogenated oils with olive and canola oils. (Canola oil margarine without trans fat is fine.)  · Replace red meat with fish, poultry, and soy protein (like tofu). · Limit processed and packaged foods like chips, crackers, and cookies.   · Bake, broil, or steam foods. Don't bass them. · Be physically active. Get at least 30 minutes of exercise on most days of the week. Walking is a good choice. You also may want to do other activities, such as running, swimming, cycling, or playing tennis or team sports. · Stay at a healthy weight or lose weight by making the changes in eating and physical activity listed above. Losing just a small amount of weight, even 5 to 10 pounds, can reduce your risk for having a heart attack or stroke. · Do not smoke. When should you call for help? Watch closely for changes in your health, and be sure to contact your doctor if:    · You need help making lifestyle changes.     · You have questions about your medicine. Where can you learn more? Go to http://www.gray.com/  Enter D113 in the search box to learn more about \"High Cholesterol: Care Instructions. \"  Current as of: August 31, 2020               Content Version: 12.8  © 2006-2021 [employer]. Care instructions adapted under license by [employer] (which disclaims liability or warranty for this information). If you have questions about a medical condition or this instruction, always ask your healthcare professional. [address] any warranty or liability for your use of this information. Prediabetes: Care Instructions  Overview     Prediabetes is a warning sign that you're at risk for getting type 2 diabetes. It means that your blood sugar is higher than it should be. But it's not high enough to be diabetes. The food you eat naturally turns into sugar. Your body uses the sugar for energy. Normally, an organ called the pancreas makes insulin. And insulin allows the sugar in your blood to get into your body's cells. But sometimes the body can't use insulin the right way. So the sugar stays in your blood instead. This is called insulin resistance.  The buildup of sugar in your blood means you have prediabetes. The good news is that you may be able to prevent or delay diabetes. Making small lifestyle changes, like getting active and changing your eating habits, may help you get your blood sugar back to normal. You can work with your doctor to make a treatment plan. Follow-up care is a key part of your treatment and safety. Be sure to make and go to all appointments, and call your doctor if you are having problems. It's also a good idea to know your test results and keep a list of the medicines you take. How can you care for yourself at home? · Watch your weight. A healthy weight helps your body use insulin properly. · Limit the amount of calories, sweets, and unhealthy fat you eat. Ask your doctor if you should see a dietitian. A registered dietitian can help you create meal plans that fit your lifestyle. · Get at least 30 minutes of exercise on most days of the week. Exercise helps control your blood sugar. It also helps you maintain a healthy weight. Walking is a good choice. You also may want to do other activities, such as running, swimming, cycling, or playing tennis or team sports. · Do not smoke. Smoking can make prediabetes worse. If you need help quitting, talk to your doctor about stop-smoking programs and medicines. These can increase your chances of quitting for good. · If your doctor prescribed medicines, take them exactly as prescribed. Call your doctor if you think you are having a problem with your medicine. You will get more details on the specific medicines your doctor prescribes. When should you call for help? Watch closely for changes in your health, and be sure to contact your doctor if:    · You have any symptoms of diabetes. These may include:  ? Being thirsty more often. ? Urinating more. ? Being hungrier. ? Losing weight. ? Being very tired. ?  Having blurry vision.     · You have a wound that will not heal.     · You have an infection that will not go away.     · You have problems with your blood pressure.     · You want more information about diabetes and how you can keep from getting it. Where can you learn more? Go to http://www.gray.com/  Enter I222 in the search box to learn more about \"Prediabetes: Care Instructions. \"  Current as of: August 31, 2020               Content Version: 12.8  © 4259-3649 [employer]. Care instructions adapted under license by [employer] (which disclaims liability or warranty for this information). If you have questions about a medical condition or this instruction, always ask your healthcare professional. [place] [address] any warranty or liability for your use of this information.

## 2021-06-22 NOTE — PROGRESS NOTES
Mckayla Esparza is a 46 y.o. female    Chief Complaint   Patient presents with    Medication Refill    Labs     Pt wants to discuss lab results. 1. Have you been to the ER, urgent care clinic since your last visit? Hospitalized since your last visit? No    2. Have you seen or consulted any other health care providers outside of the [address] [place] [address] since your last visit? Include any pap smears or colon screening.  No

## 2021-06-27 NOTE — PROGRESS NOTES
Subjective:   Abhay Sharif is a 46 y.o. female who is seen for evaluation of labs done during physical for work. ([address]    Patient brings lab report from Jeredrobyn Paul dated 5/11/2021 showing the following: Tot chol 220  Triglycerides 167  HDL 38      RBC 5.7  hgb 14.9  hct 43.4  mcv 76.0  MCH 26.1  plt 366    A1C 5.8    Patient states occupation health provider advised that she needed to see pcp due to anemia. Cardiovascular risk analysis - 46 y.o. female LDL goal is under 100  Her ankle has healed and she has begun walking more  She is following a healthier diet and has cut back on portion sizes  Weight down 18 lbs since last visit here in March 2021. ROS: taking medications as instructed, no medication side effects noted, no TIA's, no chest pain on exertion, no dyspnea on exertion, no swelling of ankles, no orthostatic dizziness or lightheadedness, no orthopnea or paroxysmal nocturnal dyspnea, no palpitations, no intermittent claudication symptoms. There is no problem list on file for this patient. Allergies   Allergen Reactions    Corn Hives and Itching    Fish Containing Products Shortness of Breath    Grass Pollen Hives    Progesterone Hives     History reviewed. No pertinent past medical history.   Past Surgical History:   Procedure Laterality Date    HX REFRACTIVE SURGERY Bilateral     HX TONSILLECTOMY  2014     Family History   Problem Relation Age of Onset    Cancer Mother         Breast Cancer    Breast Cancer Mother 45        alive     Social History     Tobacco Use    Smoking status: Never Smoker    Smokeless tobacco: Never Used   Substance Use Topics    Alcohol use: Not Currently          Objective:     Visit Vitals  /83 (BP 1 Location: Left upper arm, BP Patient Position: Sitting, BP Cuff Size: Adult)   Pulse 77   Temp 98.2 °F (36.8 °C) (Oral)   Resp 14   Ht 5' 7\" (1.702 m)   Wt 274 lb 6.4 oz (124.5 kg)   SpO2 99%   BMI 42.98 kg/m² Physical Examination: General appearance - alert, well appearing, and in no distress, oriented to person, place, and time and well hydrated  Mental status - normal mood, behavior, speech, dress, motor activity, and thought processes  Eyes - sclera anicteric  Neck - supple, no significant adenopathy, thyroid exam: thyroid is normal in size without nodules or tenderness  Chest - clear to auscultation, no wheezes, rales or rhonchi, symmetric air entry  Heart - normal rate, regular rhythm, normal S1, S2, no murmurs, rubs, clicks or gallops, normal bilateral carotid upstroke without bruits, no JVD  Abdomen - soft, nontender, nondistended, no masses or organomegaly  bowel sounds normal  Neurological - alert, oriented, normal speech, no focal findings or movement disorder noted  Extremities - no pedal edema noted, intact peripheral pulses  Skin - normal coloration and turgor, no rashes    Assessment/Plan:      Diagnoses and all orders for this visit:    1. Dyslipidemia  Recommended heart healthy diet  Recommended weight loss  Recommended 150 minutes moderate exercise weekly  Repeat fasting lipids in 3 months    2. Prediabetes  Counseled on therapeutic lifestyle changes  Repeat a1c in 6 months    3. Microcytosis  RBC elevated with microcytosis, anemia not present  Repeat blood work in 3 months    4. Vertigo  Symptoms responsive to prn meclizine  Refill rx  -     meclizine (ANTIVERT) 25 mg chewable tablet; Take 1 Tablet by mouth three (3) times daily as needed for Dizziness. 5. Dysfunction of both eustachian tubes  6. Non-seasonal allergic rhinitis, unspecified trigger  Controlled   Continue rx  -     montelukast (SINGULAIR) 10 mg tablet; Take 1 Tablet by mouth daily. 7. BMI 40.0-44.9, adult ([facility] [address]  I have reviewed/discussed the above normal BMI with the patient. I have recommended the following interventions: dietary management education, guidance, and counseling, encourage exercise and monitor weight . Encouraged to continue to improve diet and exercise habits  Add topiramate for appetite control.    -     topiramate (TOPAMAX) 25 mg tablet; Take 1 Tablet by mouth daily. Follow-up and Dispositions    · Return in about 3 months (around 9/22/2021) for cholesterol and fasting labs. I have discussed the diagnosis with the patient and the intended plan as seen in the above orders. The patient has received an after-visit summary and questions were answered concerning future plans. Patient conveyed understanding of the plan at the time of the visit.     Belen Fored, LEONIDAS

## 2021-10-28 ENCOUNTER — TELEPHONE (OUTPATIENT)
Dept: ENT CLINIC | Age: 41
End: 2021-10-28

## 2021-10-29 ENCOUNTER — OFFICE VISIT (OUTPATIENT)
Dept: ENT CLINIC | Age: 41
End: 2021-10-29
Payer: COMMERCIAL

## 2021-10-29 VITALS
HEART RATE: 95 BPM | OXYGEN SATURATION: 98 % | HEIGHT: 67 IN | DIASTOLIC BLOOD PRESSURE: 82 MMHG | BODY MASS INDEX: 43 KG/M2 | TEMPERATURE: 98.3 F | RESPIRATION RATE: 18 BRPM | SYSTOLIC BLOOD PRESSURE: 130 MMHG | WEIGHT: 274 LBS

## 2021-10-29 DIAGNOSIS — R09.81 CHRONIC NASAL CONGESTION: ICD-10-CM

## 2021-10-29 DIAGNOSIS — J34.3 HYPERTROPHY OF BOTH INFERIOR NASAL TURBINATES: ICD-10-CM

## 2021-10-29 DIAGNOSIS — J30.9 ALLERGIC RHINITIS, UNSPECIFIED SEASONALITY, UNSPECIFIED TRIGGER: Primary | ICD-10-CM

## 2021-10-29 DIAGNOSIS — R42 DIZZINESS, NONSPECIFIC: ICD-10-CM

## 2021-10-29 PROCEDURE — 99204 OFFICE O/P NEW MOD 45 MIN: CPT | Performed by: NURSE PRACTITIONER

## 2021-10-29 RX ORDER — AZELASTINE 1 MG/ML
1 SPRAY, METERED NASAL DAILY
Qty: 1 EACH | Refills: 0 | Status: SHIPPED | OUTPATIENT
Start: 2021-10-29 | End: 2022-01-28

## 2021-10-29 RX ORDER — FLUTICASONE PROPIONATE 50 MCG
SPRAY, SUSPENSION (ML) NASAL
Qty: 1 EACH | Refills: 0 | Status: SHIPPED | OUTPATIENT
Start: 2021-10-29 | End: 2022-02-22

## 2021-10-29 NOTE — PROGRESS NOTES
Visit Vitals  /82 (BP 1 Location: Left upper arm, BP Patient Position: Sitting, BP Cuff Size: Adult)   Pulse 95   Temp 98.3 °F (36.8 °C) (Temporal)   Resp 18   Ht 5' 7\" (1.702 m)   Wt 274 lb (124.3 kg)   SpO2 98%   BMI 42.91 kg/m²     Chief Complaint   Patient presents with    New Patient     allergies/ vertigo

## 2021-10-29 NOTE — PROGRESS NOTES
Otolaryngology-Head and Neck Surgery  New Patient Visit     Patient: London Gagnon  YOB: 1980  MRN: [id number]  Date of Service: 10/29/2021    Chief Complaint: Dizziness    History of Present Illness: London Gagnon is a 46y.o. year old female who presents today for discussion of   Dizziness x 1 year for atleast once/week  Associated symptoms tinnitus (static), postnasal drip, scratchy throat, bilateral ear pain, unsteady gait with right sway, cough, sinus pressure, hearing changes  Symptoms worse on right  Denies recent dental work, recent illness/sick contact, fever  Saw PCP in July; Tx with meclizine, singulair  Tx meclizine, Epley maneuver  Has tried various antihistamines, flonase, mucinex, peroxide in ears, etc. In past  Hx of sinus surgery, tonsillectomy  No tobacco use  Hearing test; July 2021; normal  Allergy testing 2012; multiple allergies; past immunotherapy 2012 no major improvements but moves every 2 years  No pertinent ENT family HX  Past Medical History:  History reviewed. No pertinent past medical history. Past Surgical History:   Past Surgical History:   Procedure Laterality Date    HX REFRACTIVE SURGERY Bilateral     HX TONSILLECTOMY  2014       Medications:   Current Outpatient Medications   Medication Instructions    cetirizine (ZYRTEC) 10 mg, Oral, DAILY    fluticasone propionate (FLONASE) 50 mcg/actuation nasal spray 2 Sprays, Both Nostrils, DAILY    meclizine (ANTIVERT) 25 mg, Oral, 3 TIMES DAILY AS NEEDED    montelukast (SINGULAIR) 10 mg, Oral, DAILY    topiramate (TOPAMAX) 25 mg, Oral, DAILY       Allergies:    Allergies   Allergen Reactions    Corn Hives and Itching    Fish Containing Products Shortness of Breath    Grass Pollen Hives    Progesterone Hives       Social History:   Social History     Tobacco Use    Smoking status: Never Smoker    Smokeless tobacco: Never Used   Vaping Use    Vaping Use: Never used   Substance Use Topics    Alcohol use: Not Currently    Drug use: Never        Family History:  Family History   Problem Relation Age of Onset    Cancer Mother         Breast Cancer    Breast Cancer Mother 45        alive       Review of Systems:    Consitutional: denies fever, excessive weight gain or loss. Eyes: denies diplopia, eye pain. Integumentary: denies new concerning skin lesions. Ears, Nose, Mouth, Throat: denies except as per HPI. Endocrine: denies hot or cold intolerance, increased thirst.  Respiratory: denies cough, hemoptysis, wheezing  Gastrointestinal: denies trouble swallowing, nausea, emesis, regurgitation  Musculoskeletal: denies muscle weakness or wasting  Cardiovascular: denies chest pain, shortness of breath  Neurologic: denies seizures, numbness or tingling, syncope  Hematologic: denies easy bleeding or bruising    Physical Examination:   Vitals:    10/29/21 1401   BP: 130/82   BP 1 Location: Left upper arm   BP Patient Position: Sitting   BP Cuff Size: Adult   Pulse: 95   Temp: 98.3 °F (36.8 °C)   TempSrc: Temporal   Resp: 18   Height: 5' 7\" (1.702 m)   Weight: 274 lb (124.3 kg)   SpO2: 98%        General: Comfortable, pleasant, appears stated age  Voice: Strong, speaking in full sentences, no stridor    Face: No masses or lesions, facial strength symmetric   Eyes: PERLLA. EOMs intact. No nystagmus. Ears: External ears unremarkable. Left ear canal clear. Tympanic membrane clear and intact, with visible landmarks. Clear middle ear space. Right canal with notable sclerosis. Tympanic membrane clear. Nose: External nose unremarkable. Dorsum midline. Anterior rhinoscopy demonstrates clear mucus, no lesions. Septum deviated right. Turbinates pale with hypertrophy. Oral Cavity / Oropharynx: No trismus. Mucosa pink and moist. No lesions. Tongue is midline and mobile. Palate elevates symmetrically. Uvula midline. Tonsils absent. Base of tongue soft. Floor of mouth soft. Neck: Supple. No adenopathy. Thyroid unremarkable.  Palpable laryngeal landmarks. Full neck range of motion   Neurologic: CN II - XI intact. Normal gait  [place] Hallpike: negative      Assessment and Plan:   1. Allergic Rhinitis   2. Hypertrophy of both inferior turbinates  3. Dizziness    Patient reports she has been told in the past that she has eustachian tube dysfunction. On physical exam today no fluid is noted of the middle ear. Symptoms suggest related to environmental allergies. Will have allergy testing done again as last testing was done in 2012, but patient moves every 2 years. Environmental change definitely  could be factor. Discussed using Flonase consistently daily. Will add Astelin and nasal saline flushes daily. Discussed changing antihistamines as she has been on hers for a year and a half. Liza-Hallpike negative in office today so not concerned with BPPV. If allergy testing negative and medication regimen not successful may consider consultation with Deion Gordillo for evaluation to see if possible tube candidate. May also consider vestibular physical therapy. Return to clinic in 4 weeks.               Jaymie Arzate MSN, FNP-C  [facility]  [address] [place] [address] 6  [facility]  Office Phone: [phone]

## 2021-11-05 ENCOUNTER — OFFICE VISIT (OUTPATIENT)
Dept: ENT CLINIC | Age: 41
End: 2021-11-05
Payer: COMMERCIAL

## 2021-11-05 VITALS — SYSTOLIC BLOOD PRESSURE: 140 MMHG | HEART RATE: 78 BPM | OXYGEN SATURATION: 98 % | DIASTOLIC BLOOD PRESSURE: 88 MMHG

## 2021-11-05 DIAGNOSIS — J30.89 ALLERGIC RHINITIS DUE TO OTHER ALLERGIC TRIGGER, UNSPECIFIED SEASONALITY: Primary | ICD-10-CM

## 2021-11-05 LAB
ALTERNARIA TENUIS IGE, RESP1ALTN: 5
ASPERGILLUS FUMIGATUS 1: 5
BAHIA GRASS,G17A: >13
BERMUDA GRASS IGE, RESP1BERG: 9
BIRCH,TRE1: <3
CAT EPITHELIUM, IGE, CAT: 9
CLADOSPORIUM, IGE, CLAD: 5
COCKROACH,GERMAN, 670778: 7
COTTONWOOD,IGE, CTWD: <3
DOG DANDER,IGE, DOGD: <3
DUST MITE: 9
ELM,IGE, ELM: <3
ENGLISH PLANTAIN, IGE, EGPL: 9
LAMBS QUARTER, IGE, LAMQ: 5
MAPLE/BOX ELDER,TRE3: <3
MOUSE EPITHELIA, 069518: <3
OAK, IGE, OAK: >13
OTHER,OTHU: 5
PIGWEED,WEE7: 9
RAGWEED MIX IGE: 11
SYCAMORE,TRE7: <3
T057-IGE CEDAR, RED: <3
TIMOTHY GRASS IGE, RESP1TIMG: >13
WHITE ASH, ASHW1M: <3

## 2021-11-05 PROCEDURE — 95004 PERQ TESTS W/ALRGNC XTRCS: CPT | Performed by: NURSE PRACTITIONER

## 2021-11-05 PROCEDURE — 95024 IQ TESTS W/ALLERGENIC XTRCS: CPT | Performed by: NURSE PRACTITIONER

## 2021-11-10 ENCOUNTER — TELEPHONE (OUTPATIENT)
Dept: ENT CLINIC | Age: 41
End: 2021-11-10

## 2021-11-10 DIAGNOSIS — J34.89 SINUS PRESSURE: ICD-10-CM

## 2021-11-10 DIAGNOSIS — Z91.09 MULTIPLE ENVIRONMENTAL ALLERGIES: ICD-10-CM

## 2021-11-10 DIAGNOSIS — H93.13 TINNITUS OF BOTH EARS: ICD-10-CM

## 2021-11-10 DIAGNOSIS — Z98.890 HISTORY OF SINUS SURGERY: Primary | ICD-10-CM

## 2021-11-10 NOTE — TELEPHONE ENCOUNTER
Called patient to schedule follow up after allergy test, patient already had an appointment scheduled on 11/29 for a 1 month follow up. I asked if she would like to keep this appointment and have Levi talk about her allergy test results at that time. She agreed but asked if the appointment scheduled was going to be with a doctor because Jami Carmona said something about the patient being scoped. I told her I was put a message in and ask about scheduling an appointment so she can be scoped. Patient also stated that her [address] did not receive the prescription for Azelastine and Fluticasone and I stated I would include that in the message as well.

## 2021-11-11 NOTE — TELEPHONE ENCOUNTER
Call placed to Patient and discussed allergy testing results. Due to the fact Patient moves every 2 years, immunotherapy might not be feasible. She has already tried Singulair as well. Will continue on current regimen and due to history of sinus surgery, will get CT scan of sinuses and determine next step. ETD is possible based on severe allergies and symptoms. Dupixent might be alternative?

## 2021-11-22 ENCOUNTER — HOSPITAL ENCOUNTER (OUTPATIENT)
Dept: CT IMAGING | Age: 41
Discharge: HOME | End: 2021-11-22
Attending: NURSE PRACTITIONER
Payer: COMMERCIAL

## 2021-11-22 DIAGNOSIS — H93.13 TINNITUS OF BOTH EARS: ICD-10-CM

## 2021-11-22 DIAGNOSIS — J34.89 SINUS PRESSURE: ICD-10-CM

## 2021-11-22 DIAGNOSIS — Z98.890 HISTORY OF SINUS SURGERY: ICD-10-CM

## 2021-11-22 DIAGNOSIS — Z91.09 MULTIPLE ENVIRONMENTAL ALLERGIES: ICD-10-CM

## 2021-11-22 PROCEDURE — 70486 CT MAXILLOFACIAL W/O DYE: CPT

## 2021-11-23 ENCOUNTER — OFFICE VISIT (OUTPATIENT)
Dept: FAMILY MEDICINE CLINIC | Age: 41
End: 2021-11-23
Payer: COMMERCIAL

## 2021-11-23 VITALS
RESPIRATION RATE: 16 BRPM | OXYGEN SATURATION: 100 % | SYSTOLIC BLOOD PRESSURE: 137 MMHG | WEIGHT: 271.1 LBS | DIASTOLIC BLOOD PRESSURE: 89 MMHG | TEMPERATURE: 98 F | HEART RATE: 81 BPM | BODY MASS INDEX: 42.55 KG/M2 | HEIGHT: 67 IN

## 2021-11-23 DIAGNOSIS — Z29.8 NEED FOR MALARIA PROPHYLAXIS: ICD-10-CM

## 2021-11-23 DIAGNOSIS — G89.29 CHRONIC PAIN OF LEFT ANKLE: ICD-10-CM

## 2021-11-23 DIAGNOSIS — H69.83 DYSFUNCTION OF BOTH EUSTACHIAN TUBES: ICD-10-CM

## 2021-11-23 DIAGNOSIS — M25.572 CHRONIC PAIN OF LEFT ANKLE: ICD-10-CM

## 2021-11-23 DIAGNOSIS — J30.89 NON-SEASONAL ALLERGIC RHINITIS, UNSPECIFIED TRIGGER: Primary | ICD-10-CM

## 2021-11-23 DIAGNOSIS — Z71.84 TRAVEL ADVICE ENCOUNTER: ICD-10-CM

## 2021-11-23 PROCEDURE — 99214 OFFICE O/P EST MOD 30 MIN: CPT | Performed by: NURSE PRACTITIONER

## 2021-11-23 RX ORDER — CIPROFLOXACIN 500 MG/1
500 TABLET ORAL 2 TIMES DAILY
Qty: 6 TABLET | Refills: 0 | Status: SHIPPED | OUTPATIENT
Start: 2021-11-23 | End: 2022-05-04 | Stop reason: SDUPTHER

## 2021-11-23 RX ORDER — ATOVAQUONE AND PROGUANIL HYDROCHLORIDE 250; 100 MG/1; MG/1
1 TABLET, FILM COATED ORAL DAILY
Qty: 21 TABLET | Refills: 0 | Status: SHIPPED | OUTPATIENT
Start: 2021-11-23 | End: 2022-02-22

## 2021-11-23 RX ORDER — FLUCONAZOLE 100 MG/1
100 TABLET ORAL DAILY
Qty: 10 TABLET | Refills: 0 | Status: SHIPPED | OUTPATIENT
Start: 2021-11-23 | End: 2021-11-29 | Stop reason: ALTCHOICE

## 2021-11-23 NOTE — PROGRESS NOTES
Looked at the scan. I can see evidence of her prior surgery. Overall looks good. Her allergy testing is pretty significant. She can be offered IT if she is sticking around this area for at least another 6-12 months it would probably benefit her.

## 2021-11-23 NOTE — LETTER
11/23/2021 11:10 AM    Ms. Winsome Duncan [place] [address] [address] [address]      To Whom it May Concern:    Our patient Ms. Supa Barnett has a history of allergies and chronic ankle pain and is currently taking cetirizine, mucinex, and aleve. Please feel free to contact this office if you have any further questions or concerns, [phone].         Sincerely,      Naida Ramsey NP

## 2021-11-23 NOTE — PROGRESS NOTES
Carlos Hughes is a 46 y.o. female    Chief Complaint   Patient presents with    Medication Refill     Eval Rx for Malaria. Pt is traveling to [facility]. 1. Have you been to the ER, urgent care clinic since your last visit? Hospitalized since your last visit? No    2. Have you seen or consulted any other health care providers outside of the [address] [place] [address] since your last visit? Include any pap smears or colon screening.  No

## 2021-11-26 NOTE — PROGRESS NOTES
Subjective:       46 y.o. female  Presents for f/u of allerigic rhinitis and chronic ankle pain, and in preparation for planned travel to [place]. Allergic rhinitis symptoms improved/stable, she has responded to antihistamines and has responded to intranasal azelastine, reports good compliance with these therapies, tolerating well without apparent side effects. Chronic L ankle pain has resolved for the most part following recovery from surgery but does flare occasionally after unusually long periods of standing or walking. When flares occur they respond well to treatment with otc naproxen. Patient reports planned trip to [place] next month, with departure on dec 11th and return on Dec 30th. She has rec'd vaccination against yellow fever and hep a, meningitis, hep B, Tdap is up to date, has rec'd full covid vaccination including booster, has been vaccinated against influenza this season. She has made plans to receive a booster for thyphoid. Requesting rx for malaria prophylaxis and rx for traveler's diarrhea and yeast infection (she frequently gets thrush). Objective:     ROS:   Feeling well. No dyspnea or chest pain on exertion. No abdominal pain, change in bowel habits, black or bloody stools. No urinary tract symptoms. GYN ROS: normal menses, no abnormal bleeding, pelvic pain or discharge, no breast pain or new or enlarging lumps on self exam. No neurological complaints. OBJECTIVE:   The patient appears well, alert, oriented x 3, in no distress. Visit Vitals  /89 (BP 1 Location: Left upper arm, BP Patient Position: Sitting, BP Cuff Size: Adult)   Pulse 81   Temp 98 °F (36.7 °C) (Oral)   Resp 16   Ht 5' 7\" (1.702 m)   Wt 271 lb 1.6 oz (123 kg)   SpO2 100%   BMI 42.46 kg/m²     HEENT:ENT normal.  Neck supple. No adenopathy or thyromegaly. CONRAD. Chest: Lungs are clear, good air entry, no wheezes, rhonchi or rales. Cardiovascular: S1 and S2 normal, no murmurs, regular rate and rhythm. Abdomen: soft without tenderness, guarding, mass or organomegaly. Extremities: show no edema, normal peripheral pulses. Neurological: is normal, no focal findings. Assessment/Plan:     Diagnoses and all orders for this visit:    1. Non-seasonal allergic rhinitis, unspecified trigger  2. Dysfunction of both eustachian tubes  Stable  Continue antihistamine (cetirizine), nasal azelastine, fluticasone    3. Chronic pain of left ankle  Improved  Continue otc naproxen as needed for flares    4. Travel advice encounter  -     ciprofloxacin HCl (CIPRO) 500 mg tablet; Take 1 Tablet by mouth two (2) times a day for 3 days. -     fluconazole (DIFLUCAN) 100 mg tablet; Take 1 Tablet by mouth daily. FDA advises cautious prescribing of oral fluconazole in pregnancy. 5. Need for malaria prophylaxis  -     atovaquone-proguaniL (MALARONE) 250-100 mg per tablet; Take 1 Tablet by mouth daily. Start dec 10th and continue daily dose through dec 30th      Follow-up and Dispositions    · Return in about 3 months (around 2/23/2022). I have discussed the diagnosis with the patient and the intended plan as seen in the above orders. The patient has received an after-visit summary and questions were answered concerning future plans. Patient conveyed understanding of the plan at the time of the visit.     Vernon Dunn NP

## 2021-11-29 ENCOUNTER — OFFICE VISIT (OUTPATIENT)
Dept: ENT CLINIC | Age: 41
End: 2021-11-29
Payer: COMMERCIAL

## 2021-11-29 VITALS
BODY MASS INDEX: 42.69 KG/M2 | DIASTOLIC BLOOD PRESSURE: 88 MMHG | HEART RATE: 91 BPM | HEIGHT: 67 IN | TEMPERATURE: 97.6 F | OXYGEN SATURATION: 98 % | RESPIRATION RATE: 16 BRPM | WEIGHT: 272 LBS | SYSTOLIC BLOOD PRESSURE: 130 MMHG

## 2021-11-29 DIAGNOSIS — J34.3 HYPERTROPHY OF BOTH INFERIOR NASAL TURBINATES: ICD-10-CM

## 2021-11-29 DIAGNOSIS — R42 DIZZINESS: ICD-10-CM

## 2021-11-29 DIAGNOSIS — Z91.09 MULTIPLE ENVIRONMENTAL ALLERGIES: ICD-10-CM

## 2021-11-29 DIAGNOSIS — J30.9 ALLERGIC RHINITIS, UNSPECIFIED SEASONALITY, UNSPECIFIED TRIGGER: Primary | ICD-10-CM

## 2021-11-29 PROCEDURE — 95117 IMMUNOTHERAPY INJECTIONS: CPT | Performed by: NURSE PRACTITIONER

## 2021-11-29 PROCEDURE — 99213 OFFICE O/P EST LOW 20 MIN: CPT | Performed by: NURSE PRACTITIONER

## 2021-11-29 NOTE — PROGRESS NOTES
Otolaryngology-Head and Neck Surgery  Follow Up Patient Visit     Patient: Kari Guevara  YOB: 1980  MRN: [id number]  Date of Service:  11/29/2021    Chief Complaint: Dizziness, environmental allergies    History of Present Illness: Kari Guevara is a 46y.o. year old female who was last seen 10/29/2021 for dizziness. she presents today for follow up after CT scan and medication start. Reports not feeling well today. +eye itching, sinus pressure, congestion, PND  Has been using allergy eye drops frequently; more dry than normal  Has not started Astelin or changed antihistamine yet; wants to wait until after her trip to [facility]. Will switch to Xyzal after returns. Using Flonase, Zyrtec daily with little improvement. Does not want to try Singulair as it caused more vertigo. Allergy testing reviewed prior to visit and recommended immunotherapy; has done in past but was painful so she is reluctant. Past Medical History:  No past medical history on file. Past Surgical History:   Past Surgical History:   Procedure Laterality Date    HX REFRACTIVE SURGERY Bilateral     HX TONSILLECTOMY  2014       Medications:   Current Outpatient Medications   Medication Instructions    atovaquone-proguaniL (MALARONE) 250-100 mg per tablet 1 Tablet, Oral, DAILY, Start dec 10th and continue daily dose through dec 30th    azelastine (ASTELIN) 137 mcg (0.1 %) nasal spray 1 [place], Both Nostrils, DAILY, Use in each nostril as directed    cetirizine (ZYRTEC) 10 mg, Oral, DAILY    fluticasone propionate (FLONASE) 50 mcg/actuation nasal spray 2 Sprays, Both Nostrils, DAILY    fluticasone propionate (FLONASE) 50 mcg/actuation nasal spray 2 sprays in each nostril daily    meclizine (ANTIVERT) 25 mg, Oral, 3 TIMES DAILY AS NEEDED    topiramate (TOPAMAX) 25 mg, Oral, DAILY       Allergies:    Allergies   Allergen Reactions    Corn Hives and Itching    Fish Containing Products Shortness of Breath    Grass Pollen Hives    Progesterone Hives       Social History:   Social History     Tobacco Use    Smoking status: Never Smoker    Smokeless tobacco: Never Used   Vaping Use    Vaping Use: Never used   Substance Use Topics    Alcohol use: Not Currently    Drug use: Never       Family History:  Family History   Problem Relation Age of Onset    Cancer Mother         Breast Cancer    Breast Cancer Mother 45        alive       Review of Systems:  Consitutional: denies fever, excessive weight gain or loss. Eyes: denies diplopia, eye pain. Integumentary: denies new concerning skin lesions. Ears, Nose, Mouth, Throat: denies except as per HPI. Endocrine: denies hot or cold intolerance, increased thirst.  Respiratory: denies cough, hemoptysis, wheezing  Gastrointestinal: denies trouble swallowing, nausea, emesis, regurgitation  Musculoskeletal: denies muscle weakness or wasting  Cardiovascular: denies chest pain, shortness of breath  Neurologic: denies seizures, numbness or tingling, syncope  Hematologic: denies easy bleeding or bruising    Physical Examination:   Vitals:    11/29/21 0909   BP: 130/88   BP 1 Location: Left upper arm   BP Patient Position: Sitting   BP Cuff Size: Large adult   Pulse: 91   Temp: 97.6 °F (36.4 °C)   TempSrc: Temporal   Resp: 16   Height: 5' 7\" (1.702 m)   Weight: 272 lb (123.4 kg)   SpO2: 98%         General: Comfortable, pleasant, appears stated age  Voice: Strong, speaking in full sentences, no stridor    Face: No masses or lesions, facial strength symmetric. Bilateral eyes with periorbital edema. Ears: External ears unremarkable. Bilateral ear canal clear. Tympanic membrane clear and intact, with visible landmarks. Clear middle ear space  Nose: External nose unremarkable. Dorsum midline. Anterior rhinoscopy demonstrates no lesions. Septum deviated right. Turbinates pale and edematous. Oral Cavity / Oropharynx: No trismus. Mucosa pink and moist. No lesions.  Tongue is midline and mobile. Palate elevates symmetrically. Uvula midline. Tonsils unremarkable. Base of tongue soft. Floor of mouth soft. Neck: Supple. No adenopathy. Thyroid unremarkable. Palpable laryngeal landmarks. Full neck range of motion   Neurologic: CN II - XI intact. Normal gait      Assessment and Plan:   1. Allergic rhinitis   2. Environmental allergies  3. Dizziness  4. Hypertrophy of inferior turbinates    -CT results reviewed with patient.  -Discussed in detail that immunotherapy is best option if plan to be in area for next 6-12 months. Patient reported that she is going to [place] for 2 weeks in December and will return for 3 months and leaves for another 2 weeks to [facility], so she is unsure if immunotherapy ideal. Allergy nurse reviewed entire procedure and all questions answered.  -Patient would like to find out estimated out of pocket expense and will most likely proceed with immunotherapy.   -Continue current medication regimen.  -Return to clinic in January for medication evaluation.          Shamar Ruiz MSN, FNP-C  [facility]  [address] [place] [address]  Office Phone: [phone]

## 2021-12-06 RX ORDER — MECLIZINE HYDROCHLORIDE 25 MG/1
25 TABLET ORAL
Qty: 30 TABLET | Refills: 1 | Status: SHIPPED | OUTPATIENT
Start: 2021-12-06 | End: 2021-12-16

## 2021-12-06 RX ORDER — MECLIZINE HCL 25MG 25 MG/1
25 TABLET, CHEWABLE ORAL
Qty: 30 TABLET | Refills: 2 | Status: CANCELLED | OUTPATIENT
Start: 2021-12-06

## 2022-01-28 ENCOUNTER — OFFICE VISIT (OUTPATIENT)
Dept: ENT CLINIC | Age: 42
End: 2022-01-28
Payer: COMMERCIAL

## 2022-01-28 VITALS
OXYGEN SATURATION: 99 % | DIASTOLIC BLOOD PRESSURE: 70 MMHG | SYSTOLIC BLOOD PRESSURE: 120 MMHG | WEIGHT: 277 LBS | HEIGHT: 67 IN | BODY MASS INDEX: 43.47 KG/M2 | TEMPERATURE: 98.4 F | RESPIRATION RATE: 16 BRPM | HEART RATE: 68 BPM

## 2022-01-28 DIAGNOSIS — J34.3 HYPERTROPHY OF BOTH INFERIOR NASAL TURBINATES: ICD-10-CM

## 2022-01-28 DIAGNOSIS — J30.9 ALLERGIC RHINITIS, UNSPECIFIED SEASONALITY, UNSPECIFIED TRIGGER: Primary | ICD-10-CM

## 2022-01-28 DIAGNOSIS — R42 DIZZINESS: ICD-10-CM

## 2022-01-28 DIAGNOSIS — Z98.890 HISTORY OF SINUS SURGERY: ICD-10-CM

## 2022-01-28 PROCEDURE — 99213 OFFICE O/P EST LOW 20 MIN: CPT | Performed by: NURSE PRACTITIONER

## 2022-01-28 NOTE — PROGRESS NOTES
Visit Vitals  /70 (BP 1 Location: Left upper arm, BP Patient Position: Sitting, BP Cuff Size: Large adult)   Pulse 68   Temp 98.4 °F (36.9 °C)   Resp 16   Ht 5' 7\" (1.702 m)   Wt 277 lb (125.6 kg)   SpO2 99%   BMI 43.38 kg/m²     Chief Complaint   Patient presents with    Follow-up     sinus

## 2022-01-28 NOTE — PROGRESS NOTES
Otolaryngology-Head and Neck Surgery  Follow Up Patient Visit     Patient: Gita Sanchez  YOB: 1980  MRN: [id number]  Date of Service:  1/28/2022    Chief Complaint: Dizziness, allergies    Interval Hx:  Reports using Xyzal, Flonase daily    Uses Astelin intermittently as does not like the mucus it brings up. Has continued dizziness, especially lying on left side      History of Present Illness: Gita Sanchez is a 46y.o. year old female who was last seen 11/29/21 for dizziness, allergies. she presents today for follow up. Dizziness x 1 year for atleast once/week  Associated symptoms tinnitus (static), postnasal drip, scratchy throat, bilateral ear pain, unsteady gait with right sway, cough, sinus pressure, hearing changes  Symptoms worse on right  Denies recent dental work, recent illness/sick contact, fever  Saw PCP in July; Tx with meclizine, singulair  Tx meclizine, Epley maneuver  Has tried various antihistamines, flonase, mucinex, peroxide in ears, etc. In past  Hx of sinus surgery, tonsillectomy  No tobacco use  Hearing test; July 2021; normal  Allergy testing 2012; multiple allergies; past immunotherapy 2012 no major improvements but moves every 2 years    11/29/21: Reports not feeling well today. +eye itching, sinus pressure, congestion, PND  Has been using allergy eye drops frequently; more dry than normal  Has not started Astelin or changed antihistamine yet; wants to wait until after her trip to [facility]. Will switch to Xyzal after returns. Using Flonase, Zyrtec daily with little improvement. Does not want to try Singulair as it caused more vertigo. Allergy testing reviewed prior to visit and recommended immunotherapy; has done in past but was painful so she is reluctant. Past Medical History:  History reviewed. No pertinent past medical history.     Past Surgical History:   Past Surgical History:   Procedure Laterality Date    HX REFRACTIVE SURGERY Bilateral     HX TONSILLECTOMY  2014       Medications:   Current Outpatient Medications   Medication Instructions    atovaquone-proguaniL (MALARONE) 250-100 mg per tablet 1 Tablet, Oral, DAILY, Start dec 10th and continue daily dose through dec 30th    fluticasone propionate (FLONASE) 50 mcg/actuation nasal spray 2 Sprays, Both Nostrils, DAILY    fluticasone propionate (FLONASE) 50 mcg/actuation nasal spray 2 sprays in each nostril daily       Allergies: Allergies   Allergen Reactions    Corn Hives and Itching    Fish Containing Products Shortness of Breath    Grass Pollen Hives    Progesterone Hives       Social History:   Social History     Tobacco Use    Smoking status: Never Smoker    Smokeless tobacco: Never Used   Vaping Use    Vaping Use: Never used   Substance Use Topics    Alcohol use: Not Currently    Drug use: Never       Family History:  Family History   Problem Relation Age of Onset    Cancer Mother         Breast Cancer    Breast Cancer Mother 45        alive       Review of Systems:  Consitutional: denies fever, excessive weight gain or loss. Eyes: denies diplopia, eye pain. Integumentary: denies new concerning skin lesions. Ears, Nose, Mouth, Throat: denies except as per HPI.   Endocrine: denies hot or cold intolerance, increased thirst.  Respiratory: denies cough, hemoptysis, wheezing  Gastrointestinal: denies trouble swallowing, nausea, emesis, regurgitation  Musculoskeletal: denies muscle weakness or wasting  Cardiovascular: denies chest pain, shortness of breath  Neurologic: denies seizures, numbness or tingling, syncope  Hematologic: denies easy bleeding or bruising    Physical Examination:   Vitals:    01/28/22 1504   BP: 120/70   BP 1 Location: Left upper arm   BP Patient Position: Sitting   BP Cuff Size: Large adult   Pulse: 68   Temp: 98.4 °F (36.9 °C)   Resp: 16   Height: 5' 7\" (1.702 m)   Weight: 277 lb (125.6 kg)   SpO2: 99%         General: Comfortable, pleasant, appears stated age  Voice: Strong, speaking in full sentences, no stridor    Face: No masses or lesions, facial strength symmetric. Bilateral eyes with swelling under orbits. Ears: External ears unremarkable. Bilateral ear canal clear. Tympanic membrane clear and intact, with visible landmarks. Clear middle ear space  Nose: External nose unremarkable. Dorsum midline. Anterior rhinoscopy demonstrates no lesions. Septum deviated right. Turbinates pale and edematous. Oral Cavity / Oropharynx: No trismus. Mucosa pink and moist. No lesions. Tongue is midline and mobile. Palate elevates symmetrically. Uvula midline. Tonsils unremarkable. Base of tongue soft. Floor of mouth soft. Neck: Supple. No adenopathy. Thyroid unremarkable. Palpable laryngeal landmarks. Full neck range of motion   Neurologic: CN II - XI intact. Normal gait      Assessment and Plan:   1. Dizziness   2. Hypertrophy of turbinates  3. Allergic rhinitis    -Discussed in detail various medication regimens. Patient will continue current.  -Will get VNG for chronic dizziness.  -Low salt, caffeine diet. -Depending upon above results, will consider neurology referral.  -Return to office after VNG.          Marysol Novoa MSN, FNP-C  [facility]  [address] [facility] [address]  [facility]  Office Phone: [phone]

## 2022-02-10 ENCOUNTER — OFFICE VISIT (OUTPATIENT)
Dept: ENT CLINIC | Age: 42
End: 2022-02-10
Payer: COMMERCIAL

## 2022-02-10 DIAGNOSIS — R42 DIZZINESS: Primary | ICD-10-CM

## 2022-02-10 PROCEDURE — 92540 BASIC VESTIBULAR EVALUATION: CPT | Performed by: AUDIOLOGIST

## 2022-02-10 PROCEDURE — 92537 CALORIC VSTBLR TEST W/REC: CPT | Performed by: AUDIOLOGIST

## 2022-02-10 NOTE — PROGRESS NOTES
Patient name: Dena Mobley   : 1980   MRN: [id number]   Appointment type: VNG    Patient is a very pleasant 46 y.o. female  referred by Esteban Kruse NP for videonystagmography (VNG) testing. Patient reports intermittent tinnitus. Patient reports vertigo described as spinning that occurs with rolling over in bed and rising from supine position. Patient has not had a recent hearing test. Patient has no reported history of noise exposure or head trauma and denies any ear pain or ear fullness/pressure. Patient denies history of stroke, seizure, falls, ear surgery, ear infection, tympanic membrane perforation, head trauma, glaucoma, neuropathy, or diabetes. Patient takes medication for sinuses. Patient denied taking any vertigo-suppressing medications or consuming alcohol for at least 48 hours prior to testing. Otoscopy: canals clear, tympanic membrane intact bilaterally    VNG RESULTS  Horizontal saccades  Average rightward peak velocity 424 deg/sec. within normal limits  Average rightward accuracy 91% within normal limits  Average rightward latency 235 ms borderline abnormal findings    Average leftward peak velocity -451 deg/sec within normal limits  Average leftward accuracy 94% within normal limits  Average leftward latency 184 ms borderline abnormal findings      Vertical saccades (no normative data available)  Average upward peak velocity 339 deg/sec.    Average upward accuracy 84%   Average upward latency 209 ms    Average downward peak velocity -261 deg/sec   Average downward accuracy 79%   Average downward latency 214 ms     Horizontal Tracking  Average rightward gain 0.80 borderline abnormal findings  Average leftward gain 0.79 borderline abnormal findings    Vertical Tracking (no normative data available)  Average upward gain 0.73   Average downward gain 0.80     OPK  40 deg/sec to the right - Peak SPV 17 deg/sec abnormal findings (cutoff is 30 deg/sec)  20 deg/sec to the right - Peak SPV 20 deg/sec within normal limits    40 deg/sec to the left - Peak SPV -30 deg/sec within normal limits  20 deg/sec to the left - Peak SPV -31 deg/sec within normal limits    Gaze Testing  Center - Peak SPV 0 deg/sec within normal limits  Right - Peak SPV 0 deg/sec within normal limits  Left - Peak SPV 0 deg/sec within normal limits  Up - Peak SPV 0 deg/sec within normal limits  Down - Peak SPV 0 deg/sec within normal limits    Positional Testing (Vision Denied)  Sitting - Peak SPV 0 deg/sec within normal limits  Supine - Peak SPV 2 deg/sec within normal limits  Head Right - Peak SPV 0 deg/sec within normal limits  Head Left - Peak SPV -2 deg/sec within normal limits    Bithermal Calorics  Right Cool - Peak SPV 14 deg/sec within normal limits  Right Warm - Peak SPV -31 deg/sec within normal limits    Left Cool - Peak SPV -14 deg/sec within normal limits  Left Warm - Peak SPV 19 deg/sec within normal limits    TotR - 45 deg/sec  TotL - 33 deg/sec    Unilateral weakness - 15% in the Left  Baseline shift - 0  deg/sec  Gain Asymmetry - 15 % to the Right    *Of note, patient reported sensation of drainage from both ears following cool and warm air calorics. Otoscopy did not reveal any active drainage. Type A tympanograms, bilaterally. Patient's right eardrum did appear quite reddish as testing continued but patient denied any pain. Impressions: The following tests had an ABNORMAL result:  OPK 40 deg/sec to the Right    Some weakness towards the left side (calorics) but not significant. Plan:   Refer to ENT/NP.           Sherrie Fitch   Doctor of Audiology

## 2022-02-15 ENCOUNTER — PATIENT MESSAGE (OUTPATIENT)
Dept: ENT CLINIC | Age: 42
End: 2022-02-15

## 2022-02-15 NOTE — TELEPHONE ENCOUNTER
Sent message via my chart about vestibular physical therapy. Patient is suppose to come to office to get a pamphlet and let me know if interested.

## 2022-02-22 DIAGNOSIS — J30.89 NON-SEASONAL ALLERGIC RHINITIS, UNSPECIFIED TRIGGER: ICD-10-CM

## 2022-02-22 DIAGNOSIS — H69.83 DYSFUNCTION OF BOTH EUSTACHIAN TUBES: ICD-10-CM

## 2022-02-22 RX ORDER — FLUTICASONE PROPIONATE 50 MCG
2 SPRAY, SUSPENSION (ML) NASAL DAILY
Qty: 3 EACH | Refills: 3 | Status: SHIPPED | OUTPATIENT
Start: 2022-02-22

## 2022-04-01 ENCOUNTER — OFFICE VISIT (OUTPATIENT)
Dept: ENT CLINIC | Age: 42
End: 2022-04-01
Payer: COMMERCIAL

## 2022-04-01 VITALS
DIASTOLIC BLOOD PRESSURE: 72 MMHG | HEART RATE: 75 BPM | TEMPERATURE: 97.9 F | RESPIRATION RATE: 16 BRPM | WEIGHT: 277 LBS | SYSTOLIC BLOOD PRESSURE: 126 MMHG | OXYGEN SATURATION: 100 % | HEIGHT: 67 IN | BODY MASS INDEX: 43.47 KG/M2

## 2022-04-01 DIAGNOSIS — R42 DIZZINESS: Primary | ICD-10-CM

## 2022-04-01 DIAGNOSIS — J30.9 ALLERGIC RHINITIS, UNSPECIFIED SEASONALITY, UNSPECIFIED TRIGGER: ICD-10-CM

## 2022-04-01 PROCEDURE — 99213 OFFICE O/P EST LOW 20 MIN: CPT | Performed by: NURSE PRACTITIONER

## 2022-04-01 RX ORDER — METHYLPREDNISOLONE 4 MG/1
TABLET ORAL
Qty: 1 DOSE PACK | Refills: 0 | Status: SHIPPED | OUTPATIENT
Start: 2022-04-01

## 2022-04-01 NOTE — PROGRESS NOTES
Otolaryngology-Head and Neck Surgery  Follow Up Patient Visit     Patient: Thaddeus York  YOB: 1980  MRN: [id number]  Date of Service:  4/1/2022    Chief Complaint: Dizziness    History of Present Illness: Thaddeus York is a 46y.o. year old female who was last seen 1/28/22 for dizziness, allergies. she presents today for     10/29/21: Dizziness x 1 year for atleast once/week  Associated symptoms tinnitus (static), postnasal drip, scratchy throat, bilateral ear pain, unsteady gait with right sway, cough, sinus pressure, hearing changes  Symptoms worse on right  Denies recent dental work, recent illness/sick contact, fever  Saw PCP in July; Tx with meclizine, singulair  Tx meclizine, Epley maneuver  Has tried various antihistamines, flonase, mucinex, peroxide in ears, etc. In past  Hx of sinus surgery, tonsillectomy  No tobacco use  Hearing test; July 2021; normal  Allergy testing 2012; multiple allergies; past immunotherapy 2012 no major improvements but moves every 2 years     11/29/21: Reports not feeling well today. +eye itching, sinus pressure, congestion, PND  Has been using allergy eye drops frequently; more dry than normal  Has not started Astelin or changed antihistamine yet; wants to wait until after her trip to [facility]. Will switch to Xyzal after returns. Using Flonase, Zyrtec daily with little improvement. Does not want to try Singulair as it caused more vertigo. Allergy testing reviewed prior to visit and recommended immunotherapy; has done in past but was painful so she is reluctant.     1/28/22: Reports using Xyzal, Flonase daily  Uses Astelin intermittently as does not like the mucus it brings up.   Has continued dizziness, especially lying on left side     Today reports dizziness for past 2 days  Describes as lasting for 5 hours  Associated symptoms: nausea, diarrhea  Recent travel by air  Has felt more congested since March  Continues to use nasal spray and Xyzal but does not like the switch  +ear fullness      Past Medical History:  History reviewed. No pertinent past medical history. Past Surgical History:   Past Surgical History:   Procedure Laterality Date    HX REFRACTIVE SURGERY Bilateral     HX TONSILLECTOMY  2014       Medications:   Current Outpatient Medications   Medication Instructions    fluticasone propionate (FLONASE) 50 mcg/actuation nasal spray 2 Sprays, Both Nostrils, DAILY       Allergies: Allergies   Allergen Reactions    Corn Hives and Itching    Fish Containing Products Shortness of Breath    Grass Pollen Hives    Progesterone Hives       Social History:   Social History     Tobacco Use    Smoking status: Never Smoker    Smokeless tobacco: Never Used   Vaping Use    Vaping Use: Never used   Substance Use Topics    Alcohol use: Not Currently    Drug use: Never       Family History:  Family History   Problem Relation Age of Onset    Cancer Mother         Breast Cancer    Breast Cancer Mother 45        alive       Review of Systems:  Consitutional: denies fever, excessive weight gain or loss. Eyes: denies diplopia, eye pain. Integumentary: denies new concerning skin lesions. Ears, Nose, Mouth, Throat: denies except as per HPI.   Endocrine: denies hot or cold intolerance, increased thirst.  Respiratory: denies cough, hemoptysis, wheezing  Gastrointestinal: denies trouble swallowing, nausea, emesis, regurgitation  Musculoskeletal: denies muscle weakness or wasting  Cardiovascular: denies chest pain, shortness of breath  Neurologic: denies seizures, numbness or tingling, syncope  Hematologic: denies easy bleeding or bruising    Physical Examination:   Vitals:    04/01/22 1023   BP: 126/72   BP 1 Location: Left upper arm   BP Patient Position: Sitting   BP Cuff Size: Large adult   Pulse: 75   Temp: 97.9 °F (36.6 °C)   TempSrc: Temporal   Resp: 16   Height: 5' 7\" (1.702 m)   Weight: 277 lb (125.6 kg)   SpO2: 100%         General: Comfortable, pleasant, appears stated age  Voice: Strong, speaking in full sentences, no stridor    Face: No masses or lesions, facial strength symmetric. Bilateral eyes with swelling. Ears: External ears unremarkable. Bilateral ear canal clear. Tympanic membrane clear and intact, with visible landmarks. Clear middle ear space  Nose: External nose unremarkable. Dorsum midline. Anterior rhinoscopy demonstrates no lesions. Septum midline. Turbinates with hypertrophy. Oral Cavity / Oropharynx: No trismus. Mucosa pink and moist. No lesions. Tongue is midline and mobile. Palate elevates symmetrically. Uvula midline. Tonsils absent. Base of tongue soft. Floor of mouth soft. Neck: Supple. No adenopathy. Thyroid unremarkable. Palpable laryngeal landmarks. Full neck range of motion   Neurologic: CN II - XI intact. Normal gait  [place] Hallpike: negative    Assessment and Plan:   1. Dizziness   2. Allergic rhinitis    -Essentially normal exam today. Discussed trigger may have been air travel.  -Based on continued congestion and current symptoms, will RX medrol dose pack. -Continue current medication regimen. If does not like Xyzal may switch to different if desires. Patient still not wanting immunotherapy.   -Based on no significant findings during VNG and chronic issue, will refer for vestibular therapy. Patient already doing therapy at [address] [place] [address], so will set up there.  -Return to office as needed.              April Fernández MSN, FNP-C  [facility]  [address] [place] [address] 6  [facility]  Office Phone: [phone]

## 2022-04-04 ENCOUNTER — TRANSCRIBE ORDER (OUTPATIENT)
Dept: SCHEDULING | Age: 42
End: 2022-04-04

## 2022-04-04 DIAGNOSIS — Z12.31 BREAST CANCER SCREENING BY MAMMOGRAM: Primary | ICD-10-CM

## 2022-04-29 ENCOUNTER — HOSPITAL ENCOUNTER (OUTPATIENT)
Dept: MAMMOGRAPHY | Age: 42
Discharge: HOME | End: 2022-04-29
Attending: NURSE PRACTITIONER
Payer: COMMERCIAL

## 2022-04-29 DIAGNOSIS — Z12.31 BREAST CANCER SCREENING BY MAMMOGRAM: ICD-10-CM

## 2022-04-29 PROCEDURE — 77063 BREAST TOMOSYNTHESIS BI: CPT

## 2022-05-04 ENCOUNTER — OFFICE VISIT (OUTPATIENT)
Dept: FAMILY MEDICINE CLINIC | Age: 42
End: 2022-05-04
Payer: COMMERCIAL

## 2022-05-04 VITALS
OXYGEN SATURATION: 97 % | SYSTOLIC BLOOD PRESSURE: 119 MMHG | DIASTOLIC BLOOD PRESSURE: 91 MMHG | HEART RATE: 97 BPM | HEIGHT: 67 IN | BODY MASS INDEX: 42.16 KG/M2 | TEMPERATURE: 98.2 F | WEIGHT: 268.6 LBS | RESPIRATION RATE: 14 BRPM

## 2022-05-04 DIAGNOSIS — Z11.59 NEED FOR HEPATITIS C SCREENING TEST: ICD-10-CM

## 2022-05-04 DIAGNOSIS — Z71.84 TRAVEL ADVICE ENCOUNTER: ICD-10-CM

## 2022-05-04 DIAGNOSIS — Z13.29 SCREENING FOR THYROID DISORDER: ICD-10-CM

## 2022-05-04 DIAGNOSIS — E78.5 DYSLIPIDEMIA: ICD-10-CM

## 2022-05-04 DIAGNOSIS — Z80.0 FAMILY HISTORY OF COLON CANCER IN MOTHER: ICD-10-CM

## 2022-05-04 DIAGNOSIS — Z00.00 WELL ADULT EXAM: Primary | ICD-10-CM

## 2022-05-04 DIAGNOSIS — R73.03 PREDIABETES: ICD-10-CM

## 2022-05-04 DIAGNOSIS — R71.8 MICROCYTOSIS: ICD-10-CM

## 2022-05-04 DIAGNOSIS — Z29.8 NEED FOR MALARIA PROPHYLAXIS: ICD-10-CM

## 2022-05-04 DIAGNOSIS — Z23 ENCOUNTER FOR IMMUNIZATION: ICD-10-CM

## 2022-05-04 DIAGNOSIS — Z11.3 SCREEN FOR STD (SEXUALLY TRANSMITTED DISEASE): ICD-10-CM

## 2022-05-04 PROCEDURE — 90471 IMMUNIZATION ADMIN: CPT | Performed by: NURSE PRACTITIONER

## 2022-05-04 PROCEDURE — 99395 PREV VISIT EST AGE 18-39: CPT | Performed by: NURSE PRACTITIONER

## 2022-05-04 PROCEDURE — 90651 9VHPV VACCINE 2/3 DOSE IM: CPT | Performed by: NURSE PRACTITIONER

## 2022-05-04 RX ORDER — FLUCONAZOLE 100 MG/1
100 TABLET ORAL DAILY
Qty: 10 TABLET | Refills: 0 | Status: SHIPPED | OUTPATIENT
Start: 2022-05-04

## 2022-05-04 RX ORDER — CIPROFLOXACIN 500 MG/1
500 TABLET ORAL 2 TIMES DAILY
Qty: 6 TABLET | Refills: 0 | Status: SHIPPED | OUTPATIENT
Start: 2022-05-04 | End: 2022-05-07

## 2022-05-04 RX ORDER — CETIRIZINE HCL 10 MG
10 TABLET ORAL DAILY
COMMUNITY

## 2022-05-04 RX ORDER — ATOVAQUONE AND PROGUANIL HYDROCHLORIDE 250; 100 MG/1; MG/1
1 TABLET, FILM COATED ORAL DAILY
Qty: 30 TABLET | Refills: 0 | Status: SHIPPED | OUTPATIENT
Start: 2022-05-04

## 2022-05-04 NOTE — PROGRESS NOTES
Krunal Lockwood is a 46 y.o. female    Chief Complaint   Patient presents with    Complete Physical    Labs    Immunization/Injection     Pt would like to Discuss HPV Immunization.  Medication Evaluation     Pt is going out of country. Pt is requesting anti malaria medication. 1. Have you been to the ER, urgent care clinic since your last visit? Hospitalized since your last visit? No    2. Have you seen or consulted any other health care providers outside of the [address] [place] [address] since your last visit? Include any pap smears or colon screening.  No no

## 2022-05-04 NOTE — PATIENT INSTRUCTIONS
HPV (Human Papillomavirus) Vaccine Gardasil®: What You Need to Know  What is HPV? Genital human papillomavirus (HPV) is the most common sexually transmitted virus in the [place]. More than half of sexually active men and women are infected with HPV at some time in their lives. About 20 million Americans are currently infected, and about 6 million more get infected each year. HPV is usually spread through sexual contact. Most HPV infections don't cause any symptoms, and go away on their own. But HPV can cause cervical cancer in women. Cervical cancer is the 2nd leading cause of cancer deaths among women around the world. In the [place], about 12,000 women get cervical cancer every year and about 4,000 are expected to die from it. HPV is also associated with several less common cancers, such as vaginal and vulvar cancers in women, and anal and oropharyngeal (back of the throat, including base of tongue and tonsils) cancers in both men and women. HPV can also cause genital warts and warts in the throat. There is no cure for HPV infection, but some of the problems it causes can be treated. HPV vaccine-Why get vaccinated? The HPV vaccine you are getting is one of two vaccines that can be given to prevent HPV. It may be given to both males and females. This vaccine can prevent most cases of cervical cancer in females, if it is given before exposure to the virus. In addition, it can prevent vaginal and vulvar cancer in females, and genital warts and anal cancer in both males and females. Protection from HPV vaccine is expected to be long-lasting. But vaccination is not a substitute for cervical cancer screening. Women should still get regular Pap tests. Who should get this HPV vaccine and when? HPV vaccine is given as a 3-dose series  · 1st Dose: Now  · 2nd Dose: 1 to 2 months after Dose 1  · 3rd Dose: 6 months after Dose 1  Additional (booster) doses are not recommended.   Routine vaccination  · This HPV vaccine is recommended for girls and boys 6or 15years of age. It may be given starting at age 5. Why is HPV vaccine recommended at 6or 15years of age? HPV infection is easily acquired, even with only one sex partner. That is why it is important to get HPV vaccine before any sexual contact takes place. Also, response to the vaccine is better at this age than at older ages. Catch-up vaccination  This vaccine is recommended for the following people who have not completed the 3-dose series:  · Females 15 through 32years of age  · Males 15 through 24years of age  This vaccine may be given to men 25 through 32years of age who have not completed the 3-dose series. It is recommended for men through age 32 who have sex with men or whose immune system is weakened because of HIV infection, other illness, or medications. HPV vaccine may be given at the same time as other vaccines. Some people should not get HPV vaccine or should wait  · Anyone who has ever had a life-threatening allergic reaction to any component of HPV vaccine, or to a previous dose of HPV vaccine, should not get the vaccine. Tell your doctor if the person getting vaccinated has any severe allergies, including an allergy to yeast.  · HPV vaccine is not recommended for pregnant women. However, receiving HPV vaccine when pregnant is not a reason to consider terminating the pregnancy. Women who are breast feeding may get the vaccine. · People who are mildly ill when a dose of HPV vaccine is planned can still be vaccinated. People with a moderate or severe illness should wait until they are better. What are the risks from this vaccine? This HPV vaccine has been used in the [place] and around the world for about six years and has been very safe. However, any medicine could possibly cause a serious problem, such as a severe allergic reaction.  The risk of any vaccine causing a serious injury, or death, is extremely small.  Life-threatening allergic reactions from vaccines are very rare. If they do occur, it would be within a few minutes to a few hours after the vaccination. Several mild to moderate problems are known to occur with this HPV vaccine. These do not last long and go away on their own. · Reactions in the arm where the shot was given:  ? Pain (about 8 people in 10)  ? Redness or swelling (about 1 person in 4)  · Fever  ? Mild (100°F) (about 1 person in 10)  ? Moderate (102°F) (about 1 person in 65)  · Other problems:  ? Headache (about 1 person in 3)  · Fainting: Brief fainting spells and related symptoms (such as jerking movements) can happen after any medical procedure, including vaccination. Sitting or lying down for about 15 minutes after a vaccination can help prevent fainting and injuries caused by falls. Tell your doctor if the patient feels dizzy or light-headed, or has vision changes or ringing in the ears. Like all vaccines, HPV vaccines will continue to be monitored for unusual or severe problems. What if there is a serious reaction? What should I look for? · Look for anything that concerns you, such as signs of a severe allergic reaction, very high fever, or behavior changes. Signs of a severe allergic reaction can include hives, swelling of the face and throat, difficulty breathing, a fast heartbeat, dizziness, and weakness. These would start a few minutes to a few hours after the vaccination. What should I do? · If you think it is a severe allergic reaction or other emergency that can't wait, call 9-1-1 or get the person to the nearest hospital. Otherwise, call your doctor. · Afterward, the reaction should be reported to the Vaccine Adverse Event Reporting System (VAERS). Your doctor might file this report, or you can do it yourself through the VAERS web site at www.vaers. hhs.gov, or by calling 1-279.864.7341. VAERS is only for reporting reactions. They do not give medical advice.   The National Vaccine Injury Compensation Program  The National Vaccine Injury Compensation Program (VICP) is a federal program that was created to compensate people who may have been injured by certain vaccines. Persons who believe they may have been injured by a vaccine can learn about the program and about filing a claim by calling 0-865.978.1286 or visiting the [address] website at www.[facility].gov/vaccinecompensation. How can I learn more? · Ask your doctor. · Call your local or state health department. · Contact the Centers for Disease Control and Prevention (CDC):  ? Call 3-162.345.5734 (5-453-MAN-INFO) or  ? Visit the CDC's website at www.cdc.gov/vaccines. Vaccine Information Statement (Interim)  HPV Vaccine (Gardasil)  (5/17/2013)  [address] [id number]  Department of Health and Human Services  Centers for Disease Control and Prevention  Many Vaccine Information Statements are available in Beninese and other languages. See www.immunize.org/vis. Muchas hojas de información sobre vacunas están disponibles en español y en otros idiomas. Visite www.immunize.org/vis. Care instructions adapted under license by [employer] (which disclaims liability or warranty for this information). If you have questions about a medical condition or this instruction, always ask your healthcare professional. [address] any warranty or liability for your use of this information. Well Visit, Ages 25 to 48: Care Instructions  Overview     Well visits can help you stay healthy. Your doctor has checked your overall health and may have suggested ways to take good care of yourself. Your doctor also may have recommended tests. At home, you can help prevent illness with healthy eating, regular exercise, and other steps. Follow-up care is a key part of your treatment and safety. Be sure to make and go to all appointments, and call your doctor if you are having problems.  It's also a good idea to know your test results and keep a list of the medicines you take. How can you care for yourself at home? · Get screening tests that you and your doctor decide on. Screening helps find diseases before any symptoms appear. · Eat healthy foods. Choose fruits, vegetables, whole grains, protein, and low-fat dairy foods. Limit fat, especially saturated fat. Reduce salt in your diet. · Limit alcohol. If you are a man, have no more than 2 drinks a day or 14 drinks a week. If you are a woman, have no more than 1 drink a day or 7 drinks a week. · Get at least 30 minutes of physical activity on most days of the week. Walking is a good choice. You also may want to do other activities, such as running, swimming, cycling, or playing tennis or team sports. Discuss any changes in your exercise program with your doctor. · Reach and stay at a healthy weight. This will lower your risk for many problems, such as obesity, diabetes, heart disease, and high blood pressure. · Do not smoke or allow others to smoke around you. If you need help quitting, talk to your doctor about stop-smoking programs and medicines. These can increase your chances of quitting for good. · Care for your mental health. It is easy to get weighed down by worry and stress. Learn strategies to manage stress, like deep breathing and mindfulness, and stay connected with your family and community. If you find you often feel sad or hopeless, talk with your doctor. Treatment can help. · Talk to your doctor about whether you have any risk factors for sexually transmitted infections (STIs). You can help prevent STIs if you wait to have sex with a new partner (or partners) until you've each been tested for STIs. It also helps if you use condoms (male or female condoms) and if you limit your sex partners to one person who only has sex with you. Vaccines are available for some STIs, such as HPV. · Use birth control if it's important to you to prevent pregnancy.  Talk with your doctor about the choices available and what might be best for you. · If you think you may have a problem with alcohol or drug use, talk to your doctor. This includes prescription medicines (such as amphetamines and opioids) and illegal drugs (such as cocaine and methamphetamine). Your doctor can help you figure out what type of treatment is best for you. · Protect your skin from too much sun. When you're outdoors from 10 a.m. to 4 p.m., stay in the shade or cover up with clothing and a hat with a wide brim. Wear sunglasses that block UV rays. Even when it's cloudy, put broad-spectrum sunscreen (SPF 30 or higher) on any exposed skin. · See a dentist one or two times a year for checkups and to have your teeth cleaned. · Wear a seat belt in the car. When should you call for help? Watch closely for changes in your health, and be sure to contact your doctor if you have any problems or symptoms that concern you. Where can you learn more? Go to http://www.[employer].com/  Enter P072 in the search box to learn more about \"Well Visit, Ages 25 to 48: Care Instructions. \"  Current as of: October 6, 2021               Content Version: 13.2  © 2006-2022 Healthwise[employer] Inc[employer]. Care instructions adapted under license by [employer] (which disclaims liability or warranty for this information). If you have questions about a medical condition or this instruction, always ask your healthcare professional. [place] [address] any warranty or liability for your use of this information.

## 2022-05-04 NOTE — PROGRESS NOTES
Subjective:   46 y.o. female for Well adult/preventative visit. Has recently begun improving her diet and exercising more regularly. Sees a dentist regularly. Getting adequate sleep. Patient reports she has had a hysterectomy for menorrhagia about 5 years ago. She would like to start vaccination for HPV. Her mother was recently diagnosed with colon cancer. Family history is also positive for breast cancer. Patient had a recent normal mammogram.    She has a trip planned next month to [facility], [facility] and [place]; malaria prophylaxis is recommended by [facility]    There is no problem list on file for this patient. Allergies   Allergen Reactions    Latex Other (comments)    Corn Hives and Itching    Fish Containing Products Shortness of Breath    Grass Pollen Hives    Progesterone Hives     History reviewed. No pertinent past medical history. Past Surgical History:   Procedure Laterality Date    HX REFRACTIVE SURGERY Bilateral     HX TONSILLECTOMY  2014     Family History   Problem Relation Age of Onset    Cancer Mother         Breast Cancer    Breast Cancer Mother 45        alive     Social History     Tobacco Use    Smoking status: Never Smoker    Smokeless tobacco: Never Used   Substance Use Topics    Alcohol use: Not Currently        ROS: Feeling generally well. No TIA's or unusual headaches, no dysphagia. No prolonged cough. No dyspnea or chest pain on exertion. No abdominal pain, change in bowel habits, black or bloody stools. No urinary tract symptoms. No new or unusual musculoskeletal symptoms. Specific concerns today: requesting referral to gyn, concerned about HPV risk, her friend will be having surgery for vaginal cancer related to HPV. Objective: The patient appears well, alert, oriented x 3, in no distress.   Visit Vitals  BP (!) 119/91 (BP 1 Location: Left upper arm, BP Patient Position: Sitting, BP Cuff Size: Adult)   Pulse 97 Temp 98.2 °F (36.8 °C) (Oral)   Resp 14   Ht 5' 7\" (1.702 m)   Wt 268 lb 9.6 oz (121.8 kg)   SpO2 97%   BMI 42.07 kg/m²     Physical Examination: General appearance - alert, well appearing, and in no distress, oriented to person, place, and time and well hydrated  Mental status - normal mood, behavior, speech, dress, motor activity, and thought processes  Eyes - sclera anicteric  Ears - bilateral TM's and external ear canals normal  Nose - normal and patent, no erythema, discharge or polyps  Mouth - mucous membranes moist, pharynx normal without lesions, dental hygiene good and tongue normal  Neck - supple, no significant adenopathy, thyroid exam: thyroid is normal in size without nodules or tenderness  Chest - clear to auscultation, no wheezes, rales or rhonchi, symmetric air entry  Heart - normal rate, regular rhythm, normal S1, S2, no murmurs, rubs, clicks or gallops, normal bilateral carotid upstroke without bruits, no JVD  Abdomen - soft, nontender, nondistended, no masses or organomegaly  bowel sounds normal  Neurological - alert, oriented, normal speech, no focal findings or movement disorder noted  Musculoskeletal - no joint tenderness, deformity or swelling, no muscular tenderness noted  Extremities - no pedal edema noted, intact peripheral pulses  Skin - normal coloration and turgor, no rashes, no suspicious skin lesions noted      Assessment/Plan:     Diagnoses and all orders for this visit:    1. Well adult exam  Well Woman  lose weight, increase physical activity, limit alcohol consumption, follow low fat diet, follow low salt diet    2. Encounter for immunization  -     HUMAN PAPILLOMA VIRUS NONAVALENT HPV 3 DOSE IM (GARDASIL 9)    3. Screening for thyroid disorder  -     TSH 3RD GENERATION; Future    4. Screen for STD (sexually transmitted disease)  -     HIV 1/2 AG/AB, 4TH GENERATION,W RFLX CONFIRM; Future  -     RPR; Future  -     CT/NG/T.VAGINALIS AMPLIFICATION; Future    5.  Need for hepatitis C screening test  -     HEPATITIS C AB; Future    6. Travel advice encounter  -     ciprofloxacin HCl (CIPRO) 500 mg tablet; Take 1 Tablet by mouth two (2) times a day for 3 days. -     fluconazole (DIFLUCAN) 100 mg tablet; Take 1 Tablet by mouth daily. FDA advises cautious prescribing of oral fluconazole in pregnancy. 7. Need for malaria prophylaxis  -     atovaquone-proguaniL (MALARONE) 250-100 mg per tablet; Take 1 Tablet by mouth daily. Begin 2 days prior to travel, take daily during your trip and continue daily for 7 days after your return    8. Family history of colon cancer in mother  Recommend starting screening early at age 36  Patient will reach out by [employer] for referral info when she returns from her trip    5. Dyslipidemia  Counseled on therapeutic lifestyle changes  Repeat fasting lipids today  -     LIPID PANEL; Future  -     METABOLIC PANEL, COMPREHENSIVE; Future    10. Prediabetes  Counseled on therapeutic lifestyle changes  Repeat A1c today  -     HEMOGLOBIN A1C WITH EAG; Future    11. Microcytosis  Repeat CBC  -     CBC W/O DIFF; Future      Follow-up and Dispositions    · Return in about 4 weeks (around 6/1/2022) for HPV vaccine dose #2. I have discussed the diagnosis with the patient and the intended plan as seen in the above orders. The patient has received an after-visit summary and questions were answered concerning future plans. Patient conveyed understanding of the plan at the time of the visit.     Herrera Roamn NP  5/4/2022

## 2022-05-05 LAB — TSH SERPL DL<=0.05 MIU/L-ACNC: 1.37 UIU/ML (ref 0.36–3.74)

## 2022-05-06 LAB
ALBUMIN SERPL-MCNC: 4.1 G/DL (ref 3.5–5)
ALBUMIN/GLOB SERPL: 1.1 {RATIO} (ref 1.1–2.2)
ALP SERPL-CCNC: 84 U/L (ref 45–117)
ALT SERPL-CCNC: 31 U/L (ref 12–78)
ANION GAP SERPL CALC-SCNC: 10 MMOL/L (ref 5–15)
AST SERPL-CCNC: 25 U/L (ref 15–37)
BILIRUB SERPL-MCNC: 0.7 MG/DL (ref 0.2–1)
BUN SERPL-MCNC: 10 MG/DL (ref 6–20)
BUN/CREAT SERPL: 10 (ref 12–20)
C TRACH RRNA SPEC QL NAA+PROBE: NEGATIVE
CALCIUM SERPL-MCNC: 9.8 MG/DL (ref 8.5–10.1)
CHLORIDE SERPL-SCNC: 106 MMOL/L (ref 97–108)
CHOLEST SERPL-MCNC: 248 MG/DL
CO2 SERPL-SCNC: 23 MMOL/L (ref 21–32)
CREAT SERPL-MCNC: 0.98 MG/DL (ref 0.55–1.02)
ERYTHROCYTE [DISTWIDTH] IN BLOOD BY AUTOMATED COUNT: 14.2 % (ref 11.5–14.5)
EST. AVERAGE GLUCOSE BLD GHB EST-MCNC: 111 MG/DL
GLOBULIN SER CALC-MCNC: 3.6 G/DL (ref 2–4)
GLUCOSE SERPL-MCNC: 110 MG/DL (ref 65–100)
HBA1C MFR BLD: 5.5 % (ref 4–5.6)
HCT VFR BLD AUTO: 51.5 % (ref 35–47)
HCV AB SERPL QL IA: NONREACTIVE
HDLC SERPL-MCNC: 44 MG/DL
HDLC SERPL: 5.6 {RATIO} (ref 0–5)
HGB BLD-MCNC: 15.7 G/DL (ref 11.5–16)
HIV 1+2 AB+HIV1 P24 AG SERPL QL IA: NONREACTIVE
HIV12 RESULT COMMENT, HHIVC: NORMAL
LDLC SERPL CALC-MCNC: 174.2 MG/DL (ref 0–100)
MCH RBC QN AUTO: 26.7 PG (ref 26–34)
MCHC RBC AUTO-ENTMCNC: 30.5 G/DL (ref 30–36.5)
MCV RBC AUTO: 87.6 FL (ref 80–99)
N GONORRHOEA RRNA SPEC QL NAA+PROBE: NEGATIVE
NRBC # BLD: 0 K/UL (ref 0–0.01)
NRBC BLD-RTO: 0 PER 100 WBC
PLATELET # BLD AUTO: 364 K/UL (ref 150–400)
PMV BLD AUTO: 10 FL (ref 8.9–12.9)
POTASSIUM SERPL-SCNC: 4 MMOL/L (ref 3.5–5.1)
PROT SERPL-MCNC: 7.7 G/DL (ref 6.4–8.2)
RBC # BLD AUTO: 5.88 M/UL (ref 3.8–5.2)
RPR SER QL: NONREACTIVE
SODIUM SERPL-SCNC: 139 MMOL/L (ref 136–145)
SPECIMEN STATUS REPORT, ROLRST: NORMAL
T VAGINALIS RRNA SPEC QL NAA+PROBE: NEGATIVE
TRIGL SERPL-MCNC: 149 MG/DL (ref ?–150)
VLDLC SERPL CALC-MCNC: 29.8 MG/DL
WBC # BLD AUTO: 7 K/UL (ref 3.6–11)

## 2022-05-10 NOTE — PROGRESS NOTES
Hemoglobin A1c is in normal range. Recommend continuing lower carb diet and regular exercise to maintain this gain. Repeat this test in 6 months. Hepatitis C screening test is negative/normal.Your lites, liver, and kidney function are normal with the exception of slightly elevated blood sugar. Recommend reducing sugar and simple carbohydrates to improve this. There is some persistent and slightly elevated elevation in your red blood cell count and hematocrit. Recommend repeating this test in 3 months. If this persists, we should consider having you see a hematologist.Syphilis screening is negative/normal.HIV screening is negative/normal.LDL and total cholesterol levels are well above goal and  are significantly more elevated than last result I have on file which you gave to me from an external lab. Recommend you focus on following a heart healthy diet low in saturated fat, increasing exercise, and losing 10 pounds. Repeat this test in 3 months. If cholesterol remains as elevated as it is now we should consider starting cholesterol-lowering medication such as atorvastatin. Chlamydia, gonorrhea, and trichomonas screening from your vaginal swab are negative.

## 2022-06-08 ENCOUNTER — OFFICE VISIT (OUTPATIENT)
Dept: FAMILY MEDICINE CLINIC | Age: 42
End: 2022-06-08
Payer: COMMERCIAL

## 2022-06-08 DIAGNOSIS — Z23 ENCOUNTER FOR IMMUNIZATION: Primary | ICD-10-CM

## 2022-06-08 PROCEDURE — 90471 IMMUNIZATION ADMIN: CPT | Performed by: FAMILY MEDICINE

## 2022-06-08 PROCEDURE — 90651 9VHPV VACCINE 2/3 DOSE IM: CPT | Performed by: FAMILY MEDICINE

## 2022-06-08 NOTE — PATIENT INSTRUCTIONS
A Healthy Lifestyle: Care Instructions  Your Care Instructions     A healthy lifestyle can help you feel good, stay at a healthy weight, and have plenty of energy for both work and play. A healthy lifestyle is something you can share with your whole family. A healthy lifestyle also can lower your risk for serious health problems, such as high blood pressure, heart disease, and diabetes. You can follow a few steps listed below to improve your health and the health of your family. Follow-up care is a key part of your treatment and safety. Be sure to make and go to all appointments, and call your doctor if you are having problems. It's also a good idea to know your test results and keep a list of the medicines you take. How can you care for yourself at home? · Do not eat too much sugar, fat, or fast foods. You can still have dessert and treats now and then. The goal is moderation. · Start small to improve your eating habits. Pay attention to portion sizes, drink less juice and soda pop, and eat more fruits and vegetables. ? Eat a healthy amount of food. A 3-ounce serving of meat, for example, is about the size of a deck of cards. Fill the rest of your plate with vegetables and whole grains. ? Limit the amount of soda and sports drinks you have every day. Drink more water when you are thirsty. ? Eat plenty of fruits and vegetables every day. Have an apple or some carrot sticks as an afternoon snack instead of a candy bar. Try to have fruits and/or vegetables at every meal.  · Make exercise part of your daily routine. You may want to start with simple activities, such as walking, bicycling, or slow swimming. Try to be active 30 to 60 minutes every day. You do not need to do all 30 to 60 minutes all at once. For example, you can exercise 3 times a day for 10 or 20 minutes.  Moderate exercise is safe for most people, but it is always a good idea to talk to your doctor before starting an exercise program.  · Keep moving. Yohannes Pendletonks the lawn, work in the garden, or [employer]. Take the stairs instead of the elevator at work. · If you smoke, quit. People who smoke have an increased risk for heart attack, stroke, cancer, and other lung illnesses. Quitting is hard, but there are ways to boost your chance of quitting tobacco for good. ? Use nicotine gum, patches, or lozenges. ? Ask your doctor about stop-smoking programs and medicines. ? Keep trying. In addition to reducing your risk of diseases in the future, you will notice some benefits soon after you stop using tobacco. If you have shortness of breath or asthma symptoms, they will likely get better within a few weeks after you quit. · Limit how much alcohol you drink. Moderate amounts of alcohol (up to 2 drinks a day for men, 1 drink a day for women) are okay. But drinking too much can lead to liver problems, high blood pressure, and other health problems. Family health  If you have a family, there are many things you can do together to improve your health. · Eat meals together as a family as often as possible. · Eat healthy foods. This includes fruits, vegetables, lean meats and dairy, and whole grains. · Include your family in your fitness plan. Most people think of activities such as jogging or tennis as the way to fitness, but there are many ways you and your family can be more active. Anything that makes you breathe hard and gets your heart pumping is exercise. Here are some tips:  ? Walk to do errands or to take your child to school or the bus.  ? Go for a family bike ride after dinner instead of watching TV. Where can you learn more? Go to http://www.gray.com/  Enter H989 in the search box to learn more about \"A Healthy Lifestyle: Care Instructions. \"  Current as of: June 16, 2021               Content Version: 13.2  © 0194-8951 Healthwise, Incorporated.    Care instructions adapted under license by [employer] (which disclaims liability or warranty for this information). If you have questions about a medical condition or this instruction, always ask your healthcare professional. [address] any warranty or liability for your use of this information.

## 2022-06-08 NOTE — PROGRESS NOTES
Chief Complaint   Patient presents with    Immunization/Injection     Patient presents in office today for [place] only for HPV #2. Pt / caregiver given opportunity to review vaccine information sheet prior to vaccine administration. Opportunity given for questions and concerns. No questions or concerns at this time.

## 2022-10-28 ENCOUNTER — TELEPHONE (OUTPATIENT)
Dept: ENT CLINIC | Age: 42
End: 2022-10-28

## 2022-10-31 ENCOUNTER — OFFICE VISIT (OUTPATIENT)
Dept: ENT CLINIC | Age: 42
End: 2022-10-31
Payer: COMMERCIAL

## 2022-10-31 ENCOUNTER — TELEPHONE (OUTPATIENT)
Dept: ENT CLINIC | Age: 42
End: 2022-10-31

## 2022-10-31 VITALS
DIASTOLIC BLOOD PRESSURE: 80 MMHG | OXYGEN SATURATION: 99 % | HEART RATE: 78 BPM | SYSTOLIC BLOOD PRESSURE: 120 MMHG | RESPIRATION RATE: 16 BRPM | WEIGHT: 266 LBS | HEIGHT: 67 IN | BODY MASS INDEX: 41.75 KG/M2

## 2022-10-31 DIAGNOSIS — R42 DIZZINESS: Primary | ICD-10-CM

## 2022-10-31 DIAGNOSIS — Z91.09 MULTIPLE ENVIRONMENTAL ALLERGIES: ICD-10-CM

## 2022-10-31 PROCEDURE — 99213 OFFICE O/P EST LOW 20 MIN: CPT | Performed by: OTOLARYNGOLOGY

## 2022-10-31 RX ORDER — MECLIZINE HYDROCHLORIDE 25 MG/1
25 TABLET ORAL
Qty: 30 TABLET | Refills: 2 | Status: SHIPPED | OUTPATIENT
Start: 2022-10-31 | End: 2022-11-10

## 2022-10-31 NOTE — PROGRESS NOTES
Chief Complaint   Patient presents with    Medication Refill     Visit Vitals  /80   Pulse 78   Resp 16   Ht 5' 7\" (1.702 m)   Wt 266 lb (120.7 kg)   SpO2 99%   BMI 41.66 kg/m²

## 2022-10-31 NOTE — PROGRESS NOTES
Otolaryngology-Head and Neck Surgery  Follow Up Patient Visit     Patient: Argelia Welsh  YOB: 1980  MRN: [id number]  Date of Service:  10/31/2022    Chief Complaint:   Chief Complaint   Patient presents with    Medication Refill       History of Present Illness: Argelia Welsh is a 38y.o. year old female who was last seen by [facility] NP for allergies and recurrent dizziness    Has recurrent dizziness, which she describes as spinning, episodes ongoing since Spring 2015  Seems to be related to when allergies are more substantial  If goes to bed congested, then can wake up and rolls over, triggering dizziness (usually when turns from R to L)    Takes Meclizine PRN in anticipation of these events, and finds this to be helpful   Takes few times / month     Manages allergies with zyrtec and flonase   Has tried xyzal, astelin and singulair (either not helpful or has not tolerated)   Does not want to do IT       Past Medical History:  No past medical history on file. Past Surgical History:   Past Surgical History:   Procedure Laterality Date    HX REFRACTIVE SURGERY Bilateral     HX TONSILLECTOMY  2014       Medications:   Current Outpatient Medications   Medication Instructions    atovaquone-proguaniL (MALARONE) 250-100 mg per tablet 1 Tablet, Oral, DAILY, Begin 2 days prior to travel, take daily during your trip and continue daily for 7 days after your return    cetirizine (ZYRTEC) 10 mg, Oral, DAILY    fluconazole (DIFLUCAN) 100 mg, Oral, DAILY, FDA advises cautious prescribing of oral fluconazole in pregnancy. fluticasone propionate (FLONASE) 50 mcg/actuation nasal spray 2 Sprays, Both Nostrils, DAILY    meclizine 50 mg tablet No dose, route, or frequency recorded. methylPREDNISolone (MEDROL DOSEPACK) 4 mg tablet As directed. Allergies:    Allergies   Allergen Reactions    Latex Other (comments)    Corn Hives and Itching    Fish Containing Products Shortness of Breath    Grass Pollen Hives Progesterone Hives       Social History:   Social History     Tobacco Use    Smoking status: Never    Smokeless tobacco: Never   Vaping Use    Vaping Use: Never used   Substance Use Topics    Alcohol use: Not Currently    Drug use: Never       Family History:  Family History   Problem Relation Age of Onset    Cancer Mother         Breast Cancer    Breast Cancer Mother 45        alive       Review of Systems:  Consitutional: denies fever, excessive weight gain or loss. Eyes: denies diplopia, eye pain. Integumentary: denies new concerning skin lesions. Ears, Nose, Mouth, Throat: denies except as per HPI. Endocrine: denies hot or cold intolerance, increased thirst.  Respiratory: denies cough, hemoptysis, wheezing  Gastrointestinal: denies trouble swallowing, nausea, emesis, regurgitation  Musculoskeletal: denies muscle weakness or wasting  Cardiovascular: denies chest pain, shortness of breath  Neurologic: denies seizures, numbness or tingling, syncope  Hematologic: denies easy bleeding or bruising    Physical Examination:   Vitals:    10/31/22 1022   BP: 120/80   Pulse: 78   Resp: 16   Height: 5' 7\" (1.702 m)   Weight: 266 lb (120.7 kg)   SpO2: 99%         General: Comfortable, pleasant, appears stated age  Voice: Strong, speaking in full sentences, no stridor    Face: No masses or lesions, facial strength symmetric   Ears: External ears unremarkable. Bilateral ear canal clear. Tympanic membrane clear and intact, with visible landmarks. Clear middle ear space  Nose: External nose unremarkable. Dorsum midline. Anterior rhinoscopy demonstrates no lesions. Septum midline. Turbinates without hypertrophy. Oral Cavity / Oropharynx: No trismus. Mucosa pink and moist. No lesions. Tongue is midline and mobile. Palate elevates symmetrically. Uvula midline. Tonsils unremarkable. Base of tongue soft. Floor of mouth soft. Neck: Supple. No adenopathy. Thyroid unremarkable. Palpable laryngeal landmarks.  Full neck range of motion   Neurologic: CN II - XI intact. Normal gait. No spontaneous, gaze induced or head shake nystagmus. Raissa Cuna negative in either direction     CT sinus 11/2021  IMPRESSION  1. Evidence of minimal disease involving the right side of the ethmoid sinus. Otherwise, normal appearance of the paranasal sinuses. 2. Evidence of mild nasal septal deviation as described above. 3. Normal aeration of the mastoid air cells. 4. Presence of a few slightly prominent preauricular lymph nodes as described  above. Assessment and Plan:   Dizziness  Allergic rhinitis  - Dizziness may be triggered by allergies  - Discussed options for better allergy control and she's had issues tolerating Rx or does not help. Can let us know if she wants to consider IT  - Otherwise, add saline irrigations  - Cont flonase, zyrtec  - Will refill meclizine Rx, discussed with patient ok for PRN use but would not recommend for daily use   - Given persistent dizziness, discussed option of additional work up  - wIll arrange balance PT  - Will arrange neurology referral  - She will consider MRI imaging but wants to defer this for now         The patient was instructed to return to clinic if no improvement or progression of symptoms. Signs to watch out for reviewed.       MD Veena Feng [facility]  [address] [place] [address] [address]  [facility]  Office Phone: [phone] room air

## 2022-10-31 NOTE — Clinical Note
Can we arrange referral to neurology and balance PT ([facility] I thnk is where she wanted to go)

## 2022-11-03 ENCOUNTER — TELEPHONE (OUTPATIENT)
Dept: FAMILY MEDICINE CLINIC | Age: 42
End: 2022-11-03

## 2022-11-03 ENCOUNTER — TELEPHONE (OUTPATIENT)
Dept: ENT CLINIC | Age: 42
End: 2022-11-03

## 2022-11-03 NOTE — TELEPHONE ENCOUNTER
attempted to reach the pt to give her the contact information for the referrals and pt did not answer and was not able to LVM due to the vmb being full.

## 2022-11-03 NOTE — TELEPHONE ENCOUNTER
Attempted to reach pt to reschedule appointment for 11/11/22 as provider not in office. If pt's call back please reschedule appointment. Thank you.

## 2022-11-11 ENCOUNTER — TELEPHONE (OUTPATIENT)
Dept: FAMILY MEDICINE CLINIC | Age: 42
End: 2022-11-11

## 2022-11-11 NOTE — TELEPHONE ENCOUNTER
I have paperwork-- Talon Adrian, APOLONIA can you let her know they did not do the hep b testing or TB screening so we can do that at her visit with me

## 2022-11-11 NOTE — TELEPHONE ENCOUNTER
PT came into office for her appointment that needed to be rescheduled due to provider being out of the office, she left paperwork that she needs filled out for her job. She would like to pick this paperwork up when she comes for her appointment on 11/18 with Monica polanco. If you have any questions please call PT at [phone] is in  the mailbox in the front office.

## 2022-11-11 NOTE — TELEPHONE ENCOUNTER
Called and spoke with pt. Pt id x 2. Notified as per Nicholas Coles PA-C and verbalized understanding.

## 2022-11-18 ENCOUNTER — OFFICE VISIT (OUTPATIENT)
Dept: FAMILY MEDICINE CLINIC | Age: 42
End: 2022-11-18
Payer: COMMERCIAL

## 2022-11-18 VITALS
SYSTOLIC BLOOD PRESSURE: 126 MMHG | HEART RATE: 79 BPM | OXYGEN SATURATION: 99 % | HEIGHT: 67 IN | DIASTOLIC BLOOD PRESSURE: 84 MMHG | TEMPERATURE: 97.5 F | WEIGHT: 265.3 LBS | BODY MASS INDEX: 41.64 KG/M2 | RESPIRATION RATE: 18 BRPM

## 2022-11-18 DIAGNOSIS — Z11.3 SCREENING EXAMINATION FOR STD (SEXUALLY TRANSMITTED DISEASE): Primary | ICD-10-CM

## 2022-11-18 DIAGNOSIS — E66.01 OBESITY, CLASS III, BMI 40-49.9 (MORBID OBESITY) (HCC): ICD-10-CM

## 2022-11-18 DIAGNOSIS — Z23 ENCOUNTER FOR IMMUNIZATION: ICD-10-CM

## 2022-11-18 DIAGNOSIS — E78.01 FAMILIAL HYPERCHOLESTEROLEMIA: ICD-10-CM

## 2022-11-18 DIAGNOSIS — Z80.0 FAMILY HISTORY OF COLON CANCER: ICD-10-CM

## 2022-11-18 DIAGNOSIS — R71.8 ELEVATED RED BLOOD CELL COUNT: ICD-10-CM

## 2022-11-18 DIAGNOSIS — Z71.84 TRAVEL ADVICE ENCOUNTER: ICD-10-CM

## 2022-11-18 DIAGNOSIS — Z11.1 SCREENING-PULMONARY TB: ICD-10-CM

## 2022-11-18 PROCEDURE — 90651 9VHPV VACCINE 2/3 DOSE IM: CPT | Performed by: FAMILY MEDICINE

## 2022-11-18 PROCEDURE — 99214 OFFICE O/P EST MOD 30 MIN: CPT | Performed by: FAMILY MEDICINE

## 2022-11-18 PROCEDURE — 90471 IMMUNIZATION ADMIN: CPT | Performed by: FAMILY MEDICINE

## 2022-11-18 NOTE — PROGRESS NOTES
Chun Flannery is a 38 y.o. female , id x 2(name and ). Reviewed record, history, and  medications. Chief Complaint   Patient presents with    Labs    Immunization/Injection     HPV    Form Completion       Vitals:    22 0900   BP: 126/84   Pulse: 79   Resp: 18   Temp: 97.5 °F (36.4 °C)   TempSrc: Oral   SpO2: 99%   Weight: 265 lb 4.8 oz (120.3 kg)   Height: 5' 7\" (1.702 m)       Coordination of Care Questionnaire:   1. Have you been to the ER, urgent care clinic since your last visit? Hospitalized since your last visit? No    2. Have you seen or consulted any other health care providers outside of the [address] [place] [address] since your last visit? Include any pap smears or colon screening. Yes Dr. Cathi Shah    After obtaining Isabel Sandoval consent, and per orders of Lorena Garber PA-C, the vaccine ordered (HPV) was given by Manny Carrillo LPN. Patient instructed to remain in clinic for 20 minutes afterwards, and to report any adverse reaction to me immediately. Patient did not display any adverse side effects.

## 2022-11-18 NOTE — PROGRESS NOTES
Claudia Espinoza (: 1980) is a 38 y.o. female, established patient, here for evaluation of the following chief complaint(s):  Labs, Immunization/Injection (HPV), and Form Completion         ASSESSMENT/PLAN:  Below is the assessment and plan developed based on review of pertinent history, physical exam, labs, studies, and medications. 1. Screening examination for STD (sexually transmitted disease)  Hep b screening needed for [employer] job   No concerning sx   -     HEP B SURFACE AG; Future    2. Elevated red blood cell count  Improved on recent labs with [facility] department-- H&H normal.   Recommend repeat in 6 mo     3. Obesity, Class III, BMI 40-49.9 (morbid obesity) ([facility])  Encouraged dietary changes and exercise 150 minutes of moderate intensity aerobic activity per week    4. Family history of colon cancer  Refer to GI to discuss early colonoscopy [place] to [place]  -     REFERRAL TO GASTROENTEROLOGY    5. Encounter for immunization  See nursing note for administration details   -     HUMAN PAPILLOMA VIRUS NONAVALENT HPV 3 DOSE IM (GARDASIL 9)  6. Screening-pulmonary TB  No concerning sx, no exposures  -     QUANTIFERON-TB GOLD PLUS    7. Travel advice encounter  Letter written with patient's current medications to take with her when she travels   Forms for [employer] will be filled out upon result of labs     8. Familial hypercholesterolemia  Recheck fasting labs today   She has not been working on dietary changes or exercising. If LDL has risen >190 will recommend statin   -     LIPID PANEL; Future    No follow-ups on file. SUBJECTIVE/OBJECTIVE:  Chief Complaint   Patient presents with    Labs    Immunization/Injection     HPV    Form Completion     Patient is a 57-year-old female presenting to the office for additional lab work, third HPV injection and forms to be filled out.   She is applying for a job at the [employer] to work and is [facility] which requires quite a bit of laboratory screening and evaluation. She had repeat CBC and CMP through the state department last week. Glucose was normal at 97. CBC had improved, red blood cells had improved down to 5.4 which is slightly high, hemoglobin and hematocrit were also improved. Cholesterol has been rechecked today, the last month that she has not been working on heart healthy dietary changes, eating probably meat and cheese or that she was trying to lower her fasting glucose because this had been normal in order for her to get the job. She is not eating working out at all. Had a stroke at age 74. She also needs hep B and TB screening. She broke her toe over the summer but otherwise is feeling well. This is completely healed at this point. Her mom was diagnosed with colon cancer at age 72, patient has no concerning symptoms but is interested in meeting with a GI doctor before she moves [place] next year    Review of Systems   Constitutional:  Negative for fatigue, fever and unexpected weight change. HENT:  Negative for hearing loss. Eyes:  Negative for pain and visual disturbance. Respiratory:  Negative for cough, chest tightness and shortness of breath. Cardiovascular:  Negative for chest pain, palpitations and leg swelling. Gastrointestinal:  Negative for abdominal distention, abdominal pain, blood in stool, constipation and diarrhea. Endocrine: Negative for cold intolerance, heat intolerance, polydipsia and polyphagia. Genitourinary:  Negative for dysuria and menstrual problem. Musculoskeletal:  Negative for arthralgias and joint swelling. Neurological:  Negative for dizziness, light-headedness and headaches. Psychiatric/Behavioral:  Negative for agitation and behavioral problems. Current Outpatient Medications on File Prior to Visit   Medication Sig Dispense Refill    cetirizine (ZYRTEC) 10 mg tablet Take 10 mg by mouth daily.       fluticasone propionate (FLONASE) 50 mcg/actuation nasal spray 2 Sprays by Both Nostrils route daily. 3 Each 3    atovaquone-proguaniL (MALARONE) 250-100 mg per tablet Take 1 Tablet by mouth daily. Begin 2 days prior to travel, take daily during your trip and continue daily for 7 days after your return 30 Tablet 0    fluconazole (DIFLUCAN) 100 mg tablet Take 1 Tablet by mouth daily. FDA advises cautious prescribing of oral fluconazole in pregnancy. 10 Tablet 0    methylPREDNISolone (MEDROL DOSEPACK) 4 mg tablet As directed. (Patient not taking: Reported on 5/4/2022) 1 Dose Pack 0     No current facility-administered medications on file prior to visit. Patient Active Problem List    Diagnosis Date Noted    Familial hypercholesterolemia 11/18/2022     Allergies   Allergen Reactions    Latex Other (comments)    Corn Hives and Itching    Fish Containing Products Shortness of Breath    Grass Pollen Hives    Progesterone Hives     Past Surgical History:   Procedure Laterality Date    HX REFRACTIVE SURGERY Bilateral     HX TONSILLECTOMY  2014     Social History     Tobacco Use    Smoking status: Never    Smokeless tobacco: Never   Vaping Use    Vaping Use: Never used   Substance Use Topics    Alcohol use: Not Currently    Drug use: Never     Family History   Problem Relation Age of Onset    Cancer Mother         Breast Cancer    Breast Cancer Mother 45        alive     Vitals:    11/18/22 0900   BP: 126/84   Pulse: 79   Resp: 18   Temp: 97.5 °F (36.4 °C)   TempSrc: Oral   SpO2: 99%   Weight: 265 lb 4.8 oz (120.3 kg)   Height: 5' 7\" (1.702 m)   PainSc:   0 - No pain        Physical Exam  Constitutional:       Appearance: Normal appearance. HENT:      Head: Normocephalic and atraumatic. Eyes:      Extraocular Movements: Extraocular movements intact. Conjunctiva/sclera: Conjunctivae normal.      Pupils: Pupils are equal, round, and reactive to light. Cardiovascular:      Rate and Rhythm: Normal rate and regular rhythm. Pulses: Normal pulses.       Heart sounds: Normal heart sounds. Pulmonary:      Effort: Pulmonary effort is normal.      Breath sounds: Normal breath sounds. Abdominal:      General: Abdomen is flat. Bowel sounds are normal.      Palpations: Abdomen is soft. Musculoskeletal:      Right lower leg: No edema. Left lower leg: No edema. Neurological:      Mental Status: She is alert. Psychiatric:         Mood and Affect: Mood normal.         Behavior: Behavior normal.           An electronic signature was used to authenticate this note.   -- Bard Preet PA-C

## 2022-11-18 NOTE — LETTER
11/18/2022 9:44 AM    Ms. Laura Kirk[id number] [id number]        Our patient Ms. Nadja Johnson has a history of allergies and chronic ankle pain and is currently taking cetirizine, meclizine, mucinex, and aleve. Please feel free to contact this office if you have any further questions or concerns, [phone].       Sincerely,      Nicole Wetzel PA-C

## 2022-11-19 LAB
CHOLEST SERPL-MCNC: 223 MG/DL
HBV SURFACE AG SER QL: <0.1 INDEX
HBV SURFACE AG SER QL: NEGATIVE
HDLC SERPL-MCNC: 43 MG/DL
HDLC SERPL: 5.2 {RATIO} (ref 0–5)
LDLC SERPL CALC-MCNC: 143.4 MG/DL (ref 0–100)
TRIGL SERPL-MCNC: 183 MG/DL (ref ?–150)
VLDLC SERPL CALC-MCNC: 36.6 MG/DL

## 2022-11-21 NOTE — PROGRESS NOTES
The 10-year ASCVD risk score (Radha WARE, et al., 2019) is: 1.1%  Cholesterol improved-- work on dietary changes and exercise.   Hep b testing negative   Recheck cholesterol 6 mo  Mychart message sent to patient

## 2022-11-25 LAB
GAMMA INTERFERON BACKGROUND BLD IA-ACNC: 0.08 IU/ML
M TB IFN-G BLD-IMP: NEGATIVE
M TB IFN-G CD4+ BCKGRND COR BLD-ACNC: 0.41 IU/ML
MITOGEN IGNF BLD-ACNC: >10 IU/ML
QUANTIFERON INCUBATION, QF1T: NORMAL
QUANTIFERON TB2 AG: 0.29 IU/ML
SERVICE CMNT-IMP: NORMAL

## 2022-11-28 NOTE — PROGRESS NOTES
Please call patient and advise   -TB screening negative. Form is filled out. Talon Adrian LPN can you print the result of the recent labs (lipid, Hep B and quantiferon) and following labs from may: Hep V, RPR, HIV and leave at  for patient to  with form?

## 2022-11-28 NOTE — PROGRESS NOTES
Called and spoke with pt. Pt id x 2. Relayed the previous message as well as mychart message per Roshan Anderson PA-C. Pt verbalized understanding. Informed form and labs will be at the  for pickup. Pt verbalized understanding.

## 2023-05-22 RX ORDER — FLUTICASONE PROPIONATE 50 MCG
2 SPRAY, SUSPENSION (ML) NASAL DAILY
COMMUNITY
Start: 2022-02-22

## 2023-05-22 RX ORDER — CETIRIZINE HYDROCHLORIDE 10 MG/1
10 TABLET ORAL DAILY
COMMUNITY